# Patient Record
Sex: FEMALE | Race: BLACK OR AFRICAN AMERICAN | NOT HISPANIC OR LATINO | Employment: OTHER | URBAN - METROPOLITAN AREA
[De-identification: names, ages, dates, MRNs, and addresses within clinical notes are randomized per-mention and may not be internally consistent; named-entity substitution may affect disease eponyms.]

---

## 2018-10-25 ENCOUNTER — HOSPITAL ENCOUNTER (INPATIENT)
Facility: HOSPITAL | Age: 68
LOS: 4 days | Discharge: HOME HEALTH | DRG: 208 | End: 2018-10-29
Attending: EMERGENCY MEDICINE | Admitting: INTERNAL MEDICINE
Payer: MEDICARE

## 2018-10-25 DIAGNOSIS — Z79.4 TYPE 2 DIABETES MELLITUS WITHOUT COMPLICATION, WITH LONG-TERM CURRENT USE OF INSULIN: ICD-10-CM

## 2018-10-25 DIAGNOSIS — J96.02 ACUTE RESPIRATORY FAILURE WITH HYPOXIA AND HYPERCAPNIA: ICD-10-CM

## 2018-10-25 DIAGNOSIS — T88.4XXA DIFFICULT INTUBATION: ICD-10-CM

## 2018-10-25 DIAGNOSIS — I46.9 PEA (PULSELESS ELECTRICAL ACTIVITY): Primary | ICD-10-CM

## 2018-10-25 DIAGNOSIS — I46.9 CARDIAC ARREST: ICD-10-CM

## 2018-10-25 DIAGNOSIS — E11.9 TYPE 2 DIABETES MELLITUS WITHOUT COMPLICATION, WITH LONG-TERM CURRENT USE OF INSULIN: ICD-10-CM

## 2018-10-25 DIAGNOSIS — J96.01 ACUTE RESPIRATORY FAILURE WITH HYPOXIA AND HYPERCAPNIA: ICD-10-CM

## 2018-10-25 DIAGNOSIS — C34.81 MALIGNANT NEOPLASM OF OVERLAPPING SITES OF RIGHT LUNG: ICD-10-CM

## 2018-10-25 DIAGNOSIS — J44.9 CHRONIC OBSTRUCTIVE PULMONARY DISEASE, UNSPECIFIED COPD TYPE: ICD-10-CM

## 2018-10-25 DIAGNOSIS — R07.9 CHEST PAIN: ICD-10-CM

## 2018-10-25 DIAGNOSIS — R07.9 ACUTE CHEST PAIN: ICD-10-CM

## 2018-10-25 LAB
ALBUMIN SERPL BCP-MCNC: 2.7 G/DL
ALLENS TEST: ABNORMAL
ALP SERPL-CCNC: 107 U/L
ALT SERPL W/O P-5'-P-CCNC: 42 U/L
AMYLASE SERPL-CCNC: 66 U/L
ANION GAP SERPL CALC-SCNC: 15 MMOL/L
ANISOCYTOSIS BLD QL SMEAR: SLIGHT
APAP SERPL-MCNC: <3 UG/ML
APTT BLDCRRT: 28.5 SEC
AST SERPL-CCNC: 70 U/L
BASOPHILS # BLD AUTO: ABNORMAL K/UL
BASOPHILS NFR BLD: 0 %
BILIRUB SERPL-MCNC: 0.4 MG/DL
BNP SERPL-MCNC: 161 PG/ML
BUN SERPL-MCNC: 19 MG/DL
CALCIUM SERPL-MCNC: 8.5 MG/DL
CHLORIDE SERPL-SCNC: 97 MMOL/L
CO2 SERPL-SCNC: 26 MMOL/L
CREAT SERPL-MCNC: 1.8 MG/DL
D DIMER PPP IA.FEU-MCNC: 7.9 MG/L FEU
DELSYS: ABNORMAL
DIFFERENTIAL METHOD: ABNORMAL
EOSINOPHIL # BLD AUTO: ABNORMAL K/UL
EOSINOPHIL NFR BLD: 1 %
ERYTHROCYTE [DISTWIDTH] IN BLOOD BY AUTOMATED COUNT: 15.2 %
ERYTHROCYTE [SEDIMENTATION RATE] IN BLOOD BY WESTERGREN METHOD: 20 MM/H
EST. GFR  (AFRICAN AMERICAN): 33 ML/MIN/1.73 M^2
EST. GFR  (NON AFRICAN AMERICAN): 28 ML/MIN/1.73 M^2
ETHANOL SERPL-MCNC: <10 MG/DL
FIO2: 100
GLUCOSE SERPL-MCNC: 491 MG/DL
HCO3 UR-SCNC: 26 MMOL/L (ref 24–28)
HCT VFR BLD AUTO: 29.8 %
HCT VFR BLD CALC: 30 %PCV (ref 36–54)
HGB BLD-MCNC: 10 G/DL
HGB BLD-MCNC: 8.6 G/DL
HYPOCHROMIA BLD QL SMEAR: ABNORMAL
INR PPP: 1.1
LIPASE SERPL-CCNC: 29 U/L
LYMPHOCYTES # BLD AUTO: ABNORMAL K/UL
LYMPHOCYTES NFR BLD: 28 %
MAGNESIUM SERPL-MCNC: 1.9 MG/DL
MCH RBC QN AUTO: 27.6 PG
MCHC RBC AUTO-ENTMCNC: 28.9 G/DL
MCV RBC AUTO: 96 FL
MODE: ABNORMAL
MONOCYTES # BLD AUTO: ABNORMAL K/UL
MONOCYTES NFR BLD: 8 %
NEUTROPHILS NFR BLD: 60 %
NEUTS BAND NFR BLD MANUAL: 3 %
OVALOCYTES BLD QL SMEAR: ABNORMAL
PCO2 BLDA: 94.2 MMHG (ref 35–45)
PEEP: 5
PH SMN: 7.05 [PH] (ref 7.35–7.45)
PLATELET # BLD AUTO: 182 K/UL
PLATELET BLD QL SMEAR: ABNORMAL
PMV BLD AUTO: 10.4 FL
PO2 BLDA: 220 MMHG (ref 80–100)
POC BE: -4 MMOL/L
POC IONIZED CALCIUM: 1.2 MMOL/L (ref 1.06–1.42)
POC SATURATED O2: 99 % (ref 95–100)
POC TCO2: 29 MMOL/L (ref 23–27)
POIKILOCYTOSIS BLD QL SMEAR: SLIGHT
POLYCHROMASIA BLD QL SMEAR: ABNORMAL
POTASSIUM BLD-SCNC: 4.4 MMOL/L (ref 3.5–5.1)
POTASSIUM SERPL-SCNC: 4.3 MMOL/L
PROT SERPL-MCNC: 5.8 G/DL
PROTHROMBIN TIME: 11.7 SEC
RBC # BLD AUTO: 3.12 M/UL
SALICYLATES SERPL-MCNC: <5 MG/DL
SAMPLE: ABNORMAL
SITE: ABNORMAL
SODIUM BLD-SCNC: 136 MMOL/L (ref 136–145)
SODIUM SERPL-SCNC: 138 MMOL/L
VT: 400
WBC # BLD AUTO: 8.23 K/UL

## 2018-10-25 PROCEDURE — 85610 PROTHROMBIN TIME: CPT

## 2018-10-25 PROCEDURE — 85730 THROMBOPLASTIN TIME PARTIAL: CPT

## 2018-10-25 PROCEDURE — 82150 ASSAY OF AMYLASE: CPT

## 2018-10-25 PROCEDURE — 83540 ASSAY OF IRON: CPT

## 2018-10-25 PROCEDURE — 84132 ASSAY OF SERUM POTASSIUM: CPT

## 2018-10-25 PROCEDURE — 83605 ASSAY OF LACTIC ACID: CPT

## 2018-10-25 PROCEDURE — 84145 PROCALCITONIN (PCT): CPT

## 2018-10-25 PROCEDURE — 80307 DRUG TEST PRSMV CHEM ANLYZR: CPT

## 2018-10-25 PROCEDURE — 85379 FIBRIN DEGRADATION QUANT: CPT

## 2018-10-25 PROCEDURE — 80329 ANALGESICS NON-OPIOID 1 OR 2: CPT

## 2018-10-25 PROCEDURE — 63600175 PHARM REV CODE 636 W HCPCS

## 2018-10-25 PROCEDURE — 94002 VENT MGMT INPAT INIT DAY: CPT

## 2018-10-25 PROCEDURE — 96375 TX/PRO/DX INJ NEW DRUG ADDON: CPT

## 2018-10-25 PROCEDURE — 85014 HEMATOCRIT: CPT

## 2018-10-25 PROCEDURE — 31500 INSERT EMERGENCY AIRWAY: CPT | Mod: 59

## 2018-10-25 PROCEDURE — 84484 ASSAY OF TROPONIN QUANT: CPT

## 2018-10-25 PROCEDURE — 83735 ASSAY OF MAGNESIUM: CPT

## 2018-10-25 PROCEDURE — 99900035 HC TECH TIME PER 15 MIN (STAT)

## 2018-10-25 PROCEDURE — 83880 ASSAY OF NATRIURETIC PEPTIDE: CPT

## 2018-10-25 PROCEDURE — 87040 BLOOD CULTURE FOR BACTERIA: CPT

## 2018-10-25 PROCEDURE — 99291 CRITICAL CARE FIRST HOUR: CPT | Mod: 25

## 2018-10-25 PROCEDURE — 82728 ASSAY OF FERRITIN: CPT

## 2018-10-25 PROCEDURE — 82607 VITAMIN B-12: CPT

## 2018-10-25 PROCEDURE — 80320 DRUG SCREEN QUANTALCOHOLS: CPT

## 2018-10-25 PROCEDURE — 82330 ASSAY OF CALCIUM: CPT

## 2018-10-25 PROCEDURE — 85027 COMPLETE CBC AUTOMATED: CPT

## 2018-10-25 PROCEDURE — 25000003 PHARM REV CODE 250: Performed by: EMERGENCY MEDICINE

## 2018-10-25 PROCEDURE — 83690 ASSAY OF LIPASE: CPT

## 2018-10-25 PROCEDURE — 84295 ASSAY OF SERUM SODIUM: CPT

## 2018-10-25 PROCEDURE — 96361 HYDRATE IV INFUSION ADD-ON: CPT

## 2018-10-25 PROCEDURE — 36600 WITHDRAWAL OF ARTERIAL BLOOD: CPT

## 2018-10-25 PROCEDURE — 83036 HEMOGLOBIN GLYCOSYLATED A1C: CPT

## 2018-10-25 PROCEDURE — 99292 CRITICAL CARE ADDL 30 MIN: CPT

## 2018-10-25 PROCEDURE — 82746 ASSAY OF FOLIC ACID SERUM: CPT

## 2018-10-25 PROCEDURE — 80053 COMPREHEN METABOLIC PANEL: CPT

## 2018-10-25 PROCEDURE — 12000002 HC ACUTE/MED SURGE SEMI-PRIVATE ROOM

## 2018-10-25 PROCEDURE — 82803 BLOOD GASES ANY COMBINATION: CPT

## 2018-10-25 PROCEDURE — 85007 BL SMEAR W/DIFF WBC COUNT: CPT

## 2018-10-25 PROCEDURE — 84443 ASSAY THYROID STIM HORMONE: CPT

## 2018-10-25 PROCEDURE — 84439 ASSAY OF FREE THYROXINE: CPT

## 2018-10-25 RX ORDER — KETAMINE HCL IN 0.9 % NACL 50 MG/5 ML
50 SYRINGE (ML) INTRAVENOUS
Status: COMPLETED | OUTPATIENT
Start: 2018-10-25 | End: 2018-10-25

## 2018-10-25 RX ORDER — NOREPINEPHRINE BITARTRATE/D5W 16MG/250ML
PLASTIC BAG, INJECTION (ML) INTRAVENOUS
Status: COMPLETED
Start: 2018-10-25 | End: 2018-10-25

## 2018-10-25 RX ORDER — PROPOFOL 10 MG/ML
10 INJECTION, EMULSION INTRAVENOUS CONTINUOUS
Status: DISCONTINUED | OUTPATIENT
Start: 2018-10-26 | End: 2018-10-27

## 2018-10-25 RX ORDER — PROPOFOL 10 MG/ML
INJECTION, EMULSION INTRAVENOUS
Status: COMPLETED
Start: 2018-10-25 | End: 2018-10-26

## 2018-10-25 RX ORDER — ROCURONIUM BROMIDE 10 MG/ML
INJECTION, SOLUTION INTRAVENOUS
Status: DISCONTINUED
Start: 2018-10-25 | End: 2018-10-26 | Stop reason: WASHOUT

## 2018-10-25 RX ORDER — PROPOFOL 10 MG/ML
INJECTION, EMULSION INTRAVENOUS
Status: DISCONTINUED
Start: 2018-10-25 | End: 2018-10-26 | Stop reason: WASHOUT

## 2018-10-25 RX ORDER — SUCCINYLCHOLINE CHLORIDE 20 MG/ML
INJECTION INTRAMUSCULAR; INTRAVENOUS
Status: DISPENSED
Start: 2018-10-25 | End: 2018-10-26

## 2018-10-25 RX ORDER — KETAMINE HCL IN 0.9 % NACL 50 MG/5 ML
100 SYRINGE (ML) INTRAVENOUS
Status: COMPLETED | OUTPATIENT
Start: 2018-10-25 | End: 2018-10-25

## 2018-10-25 RX ORDER — ETOMIDATE 2 MG/ML
INJECTION INTRAVENOUS
Status: DISCONTINUED
Start: 2018-10-25 | End: 2018-10-26 | Stop reason: WASHOUT

## 2018-10-25 RX ADMIN — SODIUM CHLORIDE 1000 ML: 0.9 INJECTION, SOLUTION INTRAVENOUS at 10:10

## 2018-10-25 RX ADMIN — Medication 100 MG: at 10:10

## 2018-10-25 RX ADMIN — Medication 50 MG: at 11:10

## 2018-10-26 PROBLEM — I50.89 OTHER HEART FAILURE: Status: ACTIVE | Noted: 2018-10-26

## 2018-10-26 PROBLEM — I46.9 PEA (PULSELESS ELECTRICAL ACTIVITY): Status: ACTIVE | Noted: 2018-10-26

## 2018-10-26 PROBLEM — J96.02 ACUTE HYPERCAPNIC RESPIRATORY FAILURE: Status: ACTIVE | Noted: 2018-10-26

## 2018-10-26 PROBLEM — E11.9 TYPE 2 DIABETES MELLITUS, WITH LONG-TERM CURRENT USE OF INSULIN: Status: ACTIVE | Noted: 2018-10-26

## 2018-10-26 PROBLEM — J44.9 COPD (CHRONIC OBSTRUCTIVE PULMONARY DISEASE): Status: ACTIVE | Noted: 2018-10-26

## 2018-10-26 PROBLEM — C34.81 MALIGNANT NEOPLASM OF OVERLAPPING SITES OF RIGHT LUNG: Status: ACTIVE | Noted: 2018-10-26

## 2018-10-26 PROBLEM — I46.9 CARDIAC ARREST: Status: ACTIVE | Noted: 2018-10-26

## 2018-10-26 PROBLEM — Z79.4 TYPE 2 DIABETES MELLITUS, WITH LONG-TERM CURRENT USE OF INSULIN: Status: ACTIVE | Noted: 2018-10-26

## 2018-10-26 PROBLEM — I10 ESSENTIAL HYPERTENSION: Status: ACTIVE | Noted: 2018-10-26

## 2018-10-26 LAB
ALBUMIN SERPL BCP-MCNC: 2.5 G/DL
ALLENS TEST: ABNORMAL
ALP SERPL-CCNC: 87 U/L
ALT SERPL W/O P-5'-P-CCNC: 36 U/L
ANION GAP SERPL CALC-SCNC: 10 MMOL/L
APTT BLDCRRT: 45.3 SEC
APTT BLDCRRT: >150 SEC
AST SERPL-CCNC: 44 U/L
BACTERIA #/AREA URNS HPF: ABNORMAL /HPF
BASOPHILS # BLD AUTO: 0 K/UL
BASOPHILS NFR BLD: 0 %
BILIRUB SERPL-MCNC: 0.6 MG/DL
BILIRUB UR QL STRIP: NEGATIVE
BUN SERPL-MCNC: 23 MG/DL
CALCIUM SERPL-MCNC: 8.6 MG/DL
CHLORIDE SERPL-SCNC: 99 MMOL/L
CHOLEST SERPL-MCNC: 138 MG/DL
CHOLEST/HDLC SERPL: 4.1 {RATIO}
CLARITY UR: CLEAR
CO2 SERPL-SCNC: 29 MMOL/L
COLOR UR: YELLOW
CREAT SERPL-MCNC: 1.6 MG/DL
DELSYS: ABNORMAL
DIFFERENTIAL METHOD: ABNORMAL
EOSINOPHIL # BLD AUTO: 0 K/UL
EOSINOPHIL NFR BLD: 0 %
ERYTHROCYTE [DISTWIDTH] IN BLOOD BY AUTOMATED COUNT: 15 %
ERYTHROCYTE [SEDIMENTATION RATE] IN BLOOD BY WESTERGREN METHOD: 26 MM/H
EST. GFR  (AFRICAN AMERICAN): 38 ML/MIN/1.73 M^2
EST. GFR  (NON AFRICAN AMERICAN): 33 ML/MIN/1.73 M^2
ESTIMATED AVG GLUCOSE: 246 MG/DL
FERRITIN SERPL-MCNC: 327 NG/ML
FIO2: 75
FOLATE SERPL-MCNC: 10.5 NG/ML
GLUCOSE SERPL-MCNC: 383 MG/DL
GLUCOSE UR QL STRIP: ABNORMAL
HBA1C MFR BLD HPLC: 10.2 %
HCO3 UR-SCNC: 30.9 MMOL/L (ref 24–28)
HCT VFR BLD AUTO: 27 %
HDLC SERPL-MCNC: 34 MG/DL
HDLC SERPL: 24.6 %
HGB BLD-MCNC: 8 G/DL
HGB UR QL STRIP: ABNORMAL
HYALINE CASTS #/AREA URNS LPF: 1 /LPF
IRON SERPL-MCNC: 66 UG/DL
KETONES UR QL STRIP: NEGATIVE
LACTATE SERPL-SCNC: 1.5 MMOL/L
LACTATE SERPL-SCNC: 2.1 MMOL/L
LACTATE SERPL-SCNC: 2.2 MMOL/L
LACTATE SERPL-SCNC: 6.3 MMOL/L
LDLC SERPL CALC-MCNC: 93 MG/DL
LEUKOCYTE ESTERASE UR QL STRIP: NEGATIVE
LYMPHOCYTES # BLD AUTO: 0.5 K/UL
LYMPHOCYTES NFR BLD: 6.4 %
MAGNESIUM SERPL-MCNC: 1.5 MG/DL
MCH RBC QN AUTO: 26.8 PG
MCHC RBC AUTO-ENTMCNC: 29.6 G/DL
MCV RBC AUTO: 91 FL
MICROSCOPIC COMMENT: ABNORMAL
MODE: ABNORMAL
MONOCYTES # BLD AUTO: 0.4 K/UL
MONOCYTES NFR BLD: 5.5 %
NEUTROPHILS # BLD AUTO: 6.7 K/UL
NEUTROPHILS NFR BLD: 87.8 %
NITRITE UR QL STRIP: NEGATIVE
NONHDLC SERPL-MCNC: 104 MG/DL
PCO2 BLDA: 46.9 MMHG (ref 35–45)
PEEP: 8
PH SMN: 7.43 [PH] (ref 7.35–7.45)
PH UR STRIP: 6 [PH] (ref 5–8)
PHOSPHATE SERPL-MCNC: 3.1 MG/DL
PLATELET # BLD AUTO: 142 K/UL
PMV BLD AUTO: 9.8 FL
PO2 BLDA: 140 MMHG (ref 80–100)
POC BE: 7 MMOL/L
POC SATURATED O2: 99 % (ref 95–100)
POC TCO2: 32 MMOL/L (ref 23–27)
POCT GLUCOSE: 290 MG/DL (ref 70–110)
POCT GLUCOSE: 337 MG/DL (ref 70–110)
POCT GLUCOSE: 359 MG/DL (ref 70–110)
POCT GLUCOSE: 363 MG/DL (ref 70–110)
POTASSIUM SERPL-SCNC: 3.5 MMOL/L
PROCALCITONIN SERPL IA-MCNC: 0.06 NG/ML
PROT SERPL-MCNC: 5.7 G/DL
PROT UR QL STRIP: ABNORMAL
RBC # BLD AUTO: 2.98 M/UL
RBC #/AREA URNS HPF: 20 /HPF (ref 0–4)
SAMPLE: ABNORMAL
SATURATED IRON: 26 %
SITE: ABNORMAL
SODIUM SERPL-SCNC: 138 MMOL/L
SP GR UR STRIP: >=1.03 (ref 1–1.03)
SQUAMOUS #/AREA URNS HPF: 4 /HPF
T4 FREE SERPL-MCNC: 0.87 NG/DL
TOTAL IRON BINDING CAPACITY: 258 UG/DL
TRANSFERRIN SERPL-MCNC: 174 MG/DL
TRIGL SERPL-MCNC: 55 MG/DL
TROPONIN I SERPL DL<=0.01 NG/ML-MCNC: 0.05 NG/ML
TROPONIN I SERPL DL<=0.01 NG/ML-MCNC: 0.23 NG/ML
TROPONIN I SERPL DL<=0.01 NG/ML-MCNC: 0.51 NG/ML
TROPONIN I SERPL DL<=0.01 NG/ML-MCNC: 0.56 NG/ML
TROPONIN I SERPL DL<=0.01 NG/ML-MCNC: 0.59 NG/ML
TSH SERPL DL<=0.005 MIU/L-ACNC: 5.81 UIU/ML
URN SPEC COLLECT METH UR: ABNORMAL
UROBILINOGEN UR STRIP-ACNC: NEGATIVE EU/DL
VIT B12 SERPL-MCNC: 1997 PG/ML
VT: 400
WBC # BLD AUTO: 7.66 K/UL
WBC #/AREA URNS HPF: 1 /HPF (ref 0–5)

## 2018-10-26 PROCEDURE — A4216 STERILE WATER/SALINE, 10 ML: HCPCS | Performed by: INTERNAL MEDICINE

## 2018-10-26 PROCEDURE — 25000003 PHARM REV CODE 250: Performed by: INTERNAL MEDICINE

## 2018-10-26 PROCEDURE — 82962 GLUCOSE BLOOD TEST: CPT

## 2018-10-26 PROCEDURE — 63600175 PHARM REV CODE 636 W HCPCS: Performed by: STUDENT IN AN ORGANIZED HEALTH CARE EDUCATION/TRAINING PROGRAM

## 2018-10-26 PROCEDURE — 25500020 PHARM REV CODE 255: Performed by: INTERNAL MEDICINE

## 2018-10-26 PROCEDURE — 25000242 PHARM REV CODE 250 ALT 637 W/ HCPCS: Performed by: STUDENT IN AN ORGANIZED HEALTH CARE EDUCATION/TRAINING PROGRAM

## 2018-10-26 PROCEDURE — 83735 ASSAY OF MAGNESIUM: CPT

## 2018-10-26 PROCEDURE — 80061 LIPID PANEL: CPT

## 2018-10-26 PROCEDURE — 5A12012 PERFORMANCE OF CARDIAC OUTPUT, SINGLE, MANUAL: ICD-10-PCS | Performed by: EMERGENCY MEDICINE

## 2018-10-26 PROCEDURE — 05HA33Z INSERTION OF INFUSION DEVICE INTO LEFT BRACHIAL VEIN, PERCUTANEOUS APPROACH: ICD-10-PCS | Performed by: EMERGENCY MEDICINE

## 2018-10-26 PROCEDURE — P9612 CATHETERIZE FOR URINE SPEC: HCPCS

## 2018-10-26 PROCEDURE — 87070 CULTURE OTHR SPECIMN AEROBIC: CPT

## 2018-10-26 PROCEDURE — 96365 THER/PROPH/DIAG IV INF INIT: CPT

## 2018-10-26 PROCEDURE — 85025 COMPLETE CBC W/AUTO DIFF WBC: CPT

## 2018-10-26 PROCEDURE — 93010 ELECTROCARDIOGRAM REPORT: CPT | Mod: ,,, | Performed by: INTERNAL MEDICINE

## 2018-10-26 PROCEDURE — 94003 VENT MGMT INPAT SUBQ DAY: CPT

## 2018-10-26 PROCEDURE — 84100 ASSAY OF PHOSPHORUS: CPT

## 2018-10-26 PROCEDURE — 0BH17EZ INSERTION OF ENDOTRACHEAL AIRWAY INTO TRACHEA, VIA NATURAL OR ARTIFICIAL OPENING: ICD-10-PCS | Performed by: EMERGENCY MEDICINE

## 2018-10-26 PROCEDURE — 93306 TTE W/DOPPLER COMPLETE: CPT

## 2018-10-26 PROCEDURE — 81000 URINALYSIS NONAUTO W/SCOPE: CPT

## 2018-10-26 PROCEDURE — 63600175 PHARM REV CODE 636 W HCPCS

## 2018-10-26 PROCEDURE — 84484 ASSAY OF TROPONIN QUANT: CPT

## 2018-10-26 PROCEDURE — 27000221 HC OXYGEN, UP TO 24 HOURS

## 2018-10-26 PROCEDURE — 36600 WITHDRAWAL OF ARTERIAL BLOOD: CPT

## 2018-10-26 PROCEDURE — 87205 SMEAR GRAM STAIN: CPT

## 2018-10-26 PROCEDURE — 63600175 PHARM REV CODE 636 W HCPCS: Performed by: EMERGENCY MEDICINE

## 2018-10-26 PROCEDURE — 63600175 PHARM REV CODE 636 W HCPCS: Performed by: INTERNAL MEDICINE

## 2018-10-26 PROCEDURE — 20000000 HC ICU ROOM

## 2018-10-26 PROCEDURE — 93005 ELECTROCARDIOGRAM TRACING: CPT

## 2018-10-26 PROCEDURE — 82803 BLOOD GASES ANY COMBINATION: CPT

## 2018-10-26 PROCEDURE — 5A1945Z RESPIRATORY VENTILATION, 24-96 CONSECUTIVE HOURS: ICD-10-PCS | Performed by: INTERNAL MEDICINE

## 2018-10-26 PROCEDURE — 94761 N-INVAS EAR/PLS OXIMETRY MLT: CPT

## 2018-10-26 PROCEDURE — 63600175 PHARM REV CODE 636 W HCPCS: Performed by: HOSPITALIST

## 2018-10-26 PROCEDURE — 94640 AIRWAY INHALATION TREATMENT: CPT

## 2018-10-26 PROCEDURE — 87040 BLOOD CULTURE FOR BACTERIA: CPT

## 2018-10-26 PROCEDURE — 83605 ASSAY OF LACTIC ACID: CPT

## 2018-10-26 PROCEDURE — 99223 1ST HOSP IP/OBS HIGH 75: CPT | Mod: ,,, | Performed by: INTERNAL MEDICINE

## 2018-10-26 PROCEDURE — 05HF33Z INSERTION OF INFUSION DEVICE INTO LEFT CEPHALIC VEIN, PERCUTANEOUS APPROACH: ICD-10-PCS | Performed by: EMERGENCY MEDICINE

## 2018-10-26 PROCEDURE — 80053 COMPREHEN METABOLIC PANEL: CPT

## 2018-10-26 PROCEDURE — 97802 MEDICAL NUTRITION INDIV IN: CPT

## 2018-10-26 PROCEDURE — 99900035 HC TECH TIME PER 15 MIN (STAT)

## 2018-10-26 PROCEDURE — 25000003 PHARM REV CODE 250: Performed by: STUDENT IN AN ORGANIZED HEALTH CARE EDUCATION/TRAINING PROGRAM

## 2018-10-26 PROCEDURE — 99900038 HC OT GENERIC THERAPY SCREENING (STAT)

## 2018-10-26 PROCEDURE — 85730 THROMBOPLASTIN TIME PARTIAL: CPT | Mod: 91

## 2018-10-26 PROCEDURE — 25000003 PHARM REV CODE 250: Performed by: EMERGENCY MEDICINE

## 2018-10-26 RX ORDER — GLUCAGON 1 MG
1 KIT INJECTION
Status: DISCONTINUED | OUTPATIENT
Start: 2018-10-26 | End: 2018-10-29 | Stop reason: HOSPADM

## 2018-10-26 RX ORDER — INSULIN ASPART 100 [IU]/ML
1-10 INJECTION, SOLUTION INTRAVENOUS; SUBCUTANEOUS EVERY 4 HOURS PRN
Status: DISCONTINUED | OUTPATIENT
Start: 2018-10-26 | End: 2018-10-26

## 2018-10-26 RX ORDER — SODIUM CHLORIDE 0.9 % (FLUSH) 0.9 %
10 SYRINGE (ML) INJECTION
Status: DISCONTINUED | OUTPATIENT
Start: 2018-10-26 | End: 2018-10-29 | Stop reason: HOSPADM

## 2018-10-26 RX ORDER — BUMETANIDE 2 MG/1
2 TABLET ORAL DAILY
Status: ON HOLD | COMMUNITY
End: 2018-10-29 | Stop reason: SDUPTHER

## 2018-10-26 RX ORDER — ONDANSETRON 4 MG/1
4 TABLET, FILM COATED ORAL EVERY 8 HOURS PRN
COMMUNITY

## 2018-10-26 RX ORDER — GABAPENTIN 100 MG/1
100 CAPSULE ORAL 2 TIMES DAILY
COMMUNITY

## 2018-10-26 RX ORDER — INSULIN ASPART 100 [IU]/ML
1-10 INJECTION, SOLUTION INTRAVENOUS; SUBCUTANEOUS EVERY 6 HOURS PRN
Status: DISCONTINUED | OUTPATIENT
Start: 2018-10-26 | End: 2018-10-26

## 2018-10-26 RX ORDER — ALBUTEROL SULFATE 90 UG/1
2 AEROSOL, METERED RESPIRATORY (INHALATION) EVERY 4 HOURS PRN
COMMUNITY

## 2018-10-26 RX ORDER — NAPROXEN 500 MG/1
500 TABLET ORAL 2 TIMES DAILY PRN
COMMUNITY

## 2018-10-26 RX ORDER — IPRATROPIUM BROMIDE 0.5 MG/2.5ML
500 SOLUTION RESPIRATORY (INHALATION) EVERY 6 HOURS PRN
COMMUNITY

## 2018-10-26 RX ORDER — HYDROCODONE BITARTRATE AND ACETAMINOPHEN 5; 325 MG/1; MG/1
1 TABLET ORAL EVERY 8 HOURS PRN
COMMUNITY

## 2018-10-26 RX ORDER — IBUPROFEN 200 MG
24 TABLET ORAL
Status: DISCONTINUED | OUTPATIENT
Start: 2018-10-26 | End: 2018-10-29 | Stop reason: HOSPADM

## 2018-10-26 RX ORDER — ATORVASTATIN CALCIUM 80 MG/1
80 TABLET, FILM COATED ORAL DAILY
COMMUNITY

## 2018-10-26 RX ORDER — PREDNISONE 10 MG/1
10 TABLET ORAL 2 TIMES DAILY
Status: DISCONTINUED | OUTPATIENT
Start: 2018-10-26 | End: 2018-10-26

## 2018-10-26 RX ORDER — PREDNISONE 10 MG/1
10 TABLET ORAL 2 TIMES DAILY
COMMUNITY

## 2018-10-26 RX ORDER — LEVOFLOXACIN 750 MG/1
750 TABLET ORAL DAILY
COMMUNITY

## 2018-10-26 RX ORDER — HYDROMORPHONE HYDROCHLORIDE 4 MG/1
4 TABLET ORAL EVERY 8 HOURS PRN
COMMUNITY

## 2018-10-26 RX ORDER — HEPARIN SODIUM,PORCINE/D5W 25000/250
18 INTRAVENOUS SOLUTION INTRAVENOUS CONTINUOUS
Status: DISCONTINUED | OUTPATIENT
Start: 2018-10-26 | End: 2018-10-26

## 2018-10-26 RX ORDER — NOREPINEPHRINE BITARTRATE/D5W 16MG/250ML
0.02 PLASTIC BAG, INJECTION (ML) INTRAVENOUS CONTINUOUS
Status: DISCONTINUED | OUTPATIENT
Start: 2018-10-26 | End: 2018-10-27

## 2018-10-26 RX ORDER — ALPRAZOLAM 1 MG/1
1 TABLET ORAL EVERY 4 HOURS PRN
COMMUNITY

## 2018-10-26 RX ORDER — AZITHROMYCIN 250 MG/1
250 TABLET, FILM COATED ORAL DAILY
COMMUNITY

## 2018-10-26 RX ORDER — ASPIRIN 81 MG/1
81 TABLET ORAL DAILY
COMMUNITY

## 2018-10-26 RX ORDER — HEPARIN SODIUM 5000 [USP'U]/ML
5000 INJECTION, SOLUTION INTRAVENOUS; SUBCUTANEOUS EVERY 8 HOURS
Status: DISCONTINUED | OUTPATIENT
Start: 2018-10-26 | End: 2018-10-29 | Stop reason: HOSPADM

## 2018-10-26 RX ORDER — ALBUTEROL SULFATE 0.83 MG/ML
2.5 SOLUTION RESPIRATORY (INHALATION) EVERY 6 HOURS PRN
COMMUNITY

## 2018-10-26 RX ORDER — PROMETHAZINE HYDROCHLORIDE AND CODEINE PHOSPHATE 6.25; 1 MG/5ML; MG/5ML
5 SOLUTION ORAL EVERY 6 HOURS PRN
COMMUNITY

## 2018-10-26 RX ORDER — INSULIN ASPART 100 [IU]/ML
1-10 INJECTION, SOLUTION INTRAVENOUS; SUBCUTANEOUS EVERY 4 HOURS PRN
Status: DISCONTINUED | OUTPATIENT
Start: 2018-10-26 | End: 2018-10-29 | Stop reason: HOSPADM

## 2018-10-26 RX ORDER — GLUCAGON 1 MG
1 KIT INJECTION
Status: DISCONTINUED | OUTPATIENT
Start: 2018-10-26 | End: 2018-10-26

## 2018-10-26 RX ORDER — PREDNISONE 20 MG/1
60 TABLET ORAL DAILY
Status: DISCONTINUED | OUTPATIENT
Start: 2018-10-26 | End: 2018-10-29 | Stop reason: HOSPADM

## 2018-10-26 RX ORDER — INSULIN DEGLUDEC 100 U/ML
80 INJECTION, SOLUTION SUBCUTANEOUS
COMMUNITY

## 2018-10-26 RX ORDER — IPRATROPIUM BROMIDE AND ALBUTEROL SULFATE 2.5; .5 MG/3ML; MG/3ML
3 SOLUTION RESPIRATORY (INHALATION) EVERY 4 HOURS
Status: DISCONTINUED | OUTPATIENT
Start: 2018-10-26 | End: 2018-10-29 | Stop reason: HOSPADM

## 2018-10-26 RX ORDER — FLUCONAZOLE 100 MG/1
100 TABLET ORAL DAILY
COMMUNITY

## 2018-10-26 RX ORDER — AMOXICILLIN AND CLAVULANATE POTASSIUM 875; 125 MG/1; MG/1
1 TABLET, FILM COATED ORAL 2 TIMES DAILY
COMMUNITY

## 2018-10-26 RX ORDER — SODIUM CHLORIDE 0.9 % (FLUSH) 0.9 %
5 SYRINGE (ML) INJECTION
Status: DISCONTINUED | OUTPATIENT
Start: 2018-10-26 | End: 2018-10-29 | Stop reason: HOSPADM

## 2018-10-26 RX ORDER — LABETALOL HYDROCHLORIDE 5 MG/ML
10 INJECTION, SOLUTION INTRAVENOUS EVERY 4 HOURS PRN
Status: DISCONTINUED | OUTPATIENT
Start: 2018-10-26 | End: 2018-10-29 | Stop reason: HOSPADM

## 2018-10-26 RX ORDER — SODIUM CHLORIDE 0.9 % (FLUSH) 0.9 %
10 SYRINGE (ML) INJECTION EVERY 6 HOURS
Status: DISCONTINUED | OUTPATIENT
Start: 2018-10-26 | End: 2018-10-29 | Stop reason: HOSPADM

## 2018-10-26 RX ORDER — CARVEDILOL 25 MG/1
25 TABLET ORAL 2 TIMES DAILY
COMMUNITY

## 2018-10-26 RX ORDER — IBUPROFEN 200 MG
16 TABLET ORAL
Status: DISCONTINUED | OUTPATIENT
Start: 2018-10-26 | End: 2018-10-29 | Stop reason: HOSPADM

## 2018-10-26 RX ORDER — INSULIN ASPART 100 [IU]/ML
32 INJECTION, SOLUTION INTRAVENOUS; SUBCUTANEOUS
COMMUNITY

## 2018-10-26 RX ORDER — MAGNESIUM SULFATE HEPTAHYDRATE 40 MG/ML
2 INJECTION, SOLUTION INTRAVENOUS ONCE
Status: COMPLETED | OUTPATIENT
Start: 2018-10-26 | End: 2018-10-26

## 2018-10-26 RX ADMIN — Medication 0.02 MCG/KG/MIN: at 02:10

## 2018-10-26 RX ADMIN — INSULIN DETEMIR 20 UNITS: 100 INJECTION, SOLUTION SUBCUTANEOUS at 08:10

## 2018-10-26 RX ADMIN — MAGNESIUM SULFATE IN WATER 2 G: 40 INJECTION, SOLUTION INTRAVENOUS at 09:10

## 2018-10-26 RX ADMIN — INSULIN DETEMIR 20 UNITS: 100 INJECTION, SOLUTION SUBCUTANEOUS at 03:10

## 2018-10-26 RX ADMIN — METHYLPREDNISOLONE SODIUM SUCCINATE 40 MG: 40 INJECTION, POWDER, FOR SOLUTION INTRAMUSCULAR; INTRAVENOUS at 08:10

## 2018-10-26 RX ADMIN — IPRATROPIUM BROMIDE AND ALBUTEROL SULFATE 3 ML: .5; 3 SOLUTION RESPIRATORY (INHALATION) at 07:10

## 2018-10-26 RX ADMIN — PREDNISONE 60 MG: 20 TABLET ORAL at 09:10

## 2018-10-26 RX ADMIN — IPRATROPIUM BROMIDE AND ALBUTEROL SULFATE 3 ML: .5; 3 SOLUTION RESPIRATORY (INHALATION) at 03:10

## 2018-10-26 RX ADMIN — HEPARIN SODIUM AND DEXTROSE 18 UNITS/KG/HR: 10000; 5 INJECTION INTRAVENOUS at 09:10

## 2018-10-26 RX ADMIN — INSULIN ASPART 8 UNITS: 100 INJECTION, SOLUTION INTRAVENOUS; SUBCUTANEOUS at 12:10

## 2018-10-26 RX ADMIN — IPRATROPIUM BROMIDE AND ALBUTEROL SULFATE 3 ML: .5; 3 SOLUTION RESPIRATORY (INHALATION) at 01:10

## 2018-10-26 RX ADMIN — IPRATROPIUM BROMIDE AND ALBUTEROL SULFATE 3 ML: .5; 3 SOLUTION RESPIRATORY (INHALATION) at 11:10

## 2018-10-26 RX ADMIN — HEPARIN SODIUM 5000 UNITS: 5000 INJECTION, SOLUTION INTRAVENOUS; SUBCUTANEOUS at 09:10

## 2018-10-26 RX ADMIN — HEPARIN SODIUM 5000 UNITS: 5000 INJECTION, SOLUTION INTRAVENOUS; SUBCUTANEOUS at 02:10

## 2018-10-26 RX ADMIN — VANCOMYCIN HYDROCHLORIDE 3000 MG: 10 INJECTION, POWDER, LYOPHILIZED, FOR SOLUTION INTRAVENOUS at 02:10

## 2018-10-26 RX ADMIN — PROPOFOL 1000 MG: 10 INJECTION, EMULSION INTRAVENOUS at 12:10

## 2018-10-26 RX ADMIN — PIPERACILLIN AND TAZOBACTAM 4.5 G: 4; .5 INJECTION, POWDER, LYOPHILIZED, FOR SOLUTION INTRAVENOUS; PARENTERAL at 01:10

## 2018-10-26 RX ADMIN — PROPOFOL 10 MCG/KG/MIN: 10 INJECTION, EMULSION INTRAVENOUS at 06:10

## 2018-10-26 RX ADMIN — INSULIN ASPART 6 UNITS: 100 INJECTION, SOLUTION INTRAVENOUS; SUBCUTANEOUS at 05:10

## 2018-10-26 RX ADMIN — HEPARIN SODIUM AND DEXTROSE 18 UNITS/KG/HR: 10000; 5 INJECTION INTRAVENOUS at 02:10

## 2018-10-26 RX ADMIN — IOHEXOL 100 ML: 350 INJECTION, SOLUTION INTRAVENOUS at 08:10

## 2018-10-26 RX ADMIN — Medication 10 ML: at 05:10

## 2018-10-26 NOTE — ED NOTES
Pt. Is awake and opens eyes upon verbal commands. Pt. Has equal, strong hand grasps bilaterally and is able to move all extremities well. Pt. Trying to reach for ETT. Dr. Magdaleno at the bedside. Verbal order received for soft restraints.

## 2018-10-26 NOTE — PLAN OF CARE
Problem: Patient Care Overview  Goal: Plan of Care Review  Outcome: Ongoing (interventions implemented as appropriate)    10/26/2018 6:51 AM   Plan of Care   Plan Of Care Reviewed With pt, Family Safety: call light in reach, current questions/concerns addressed to family, bed in lowest position with wheels locked & side rails up X 3. Pt and family were educated regarding fall precaution and taking appropriate action. Activity: bedrest.  Neurological: BONY Respiratory: Pt intubated and sedated, tolerating well, O2 sat WNL.  Cardiac: BP stable on Levo, HR stable. Afebrile this shift. Intake/Output: No bm over night. No problem with UO. Diet: NPO maintained as ordered Pain: Nonverbal signs absent Skin: dry and intact. LDA: All lines patent and infusing. Plan: Ruleout PE

## 2018-10-26 NOTE — EICU
Called to room for PICC line placement.  Consent confirmed with bedside team.  Time out completed using proper pt identifiers.  Dual lumen PICC placed to (L) arm by Essie using u/s guidance.  Blood return noted from both ports.  Lines flush easily.  Pt tolerated procedure well.  PCXR on order.

## 2018-10-26 NOTE — CONSULTS
[facility] Pulmonology Consult - Resident Note    Primary Attending Physician: Dr. Keyes  Primary Team: [facility] Internal Medicine  Consultant Attending/Fellow: Dr. Chakraborty, Dr. Oates  Consultant Resident: Dr. Soto    Reason for Consult:     Ventilator Management    Subjective:      History of Present Illness:    History obtained from chart review, patient received her care at [facility] so we do not have access to medical records.     Tammie Saunders is a 67 y.o. AA female who has a past medical history of DM2, HTN, ? History of COPD, lung CA undergoing chemotherapy, on 2LNC baseline at home. The patient presented to the [facility] on 10/25/2018 with a primary complaint of Cardiac Arrest (pt to ED via [facility], EMS reports being called out for SOB states put pt on CPAP and was unable to gain IV access. On arrival to ED pt was limp and pulse was not felt, CPR initiated. )    Patient's family reports that patient at baseline has some degree SOB 2/2 her malignancy and other poor lung function history (family not entirely clear on medical hx), utlizes 2L NC at baseline. On day of admit, she had acutely worsening shortness of breath and EMS was called. Placed on CPAP with EMS, was found to be limp on arrival to ED without pulse, PEA arrest. CPR given with 2 rounds compressions and 1 round epi before ROSC. Patient was alert, moving all extremities and following commands after regaining pulses. Patient with PRIYANKA vs CKD, so CTA was postponed and unable to obtain VQ on ventilator overnight. Empirically treated with heparin for concern for PE. She was placed on propofol overnight with brief episode of hypotension requiring pressors, resolved quickly.    Initial ABG with 7.050/94.2/220/26 on 100% FiO2 after intubation   CXR and CT noncon with slightly enlarged heart, perihilar consolidative changes bilaterally with air bronchograms, interlobular septal thickening, pulmonary edema and small bilateral pleural  "effusions. Unsure how much is chronic, though likely is aside from edema given hx of lung CA.  LE US negative for DVT  Renal function improved overnight, CTA with no evidence PE.    This morning patient became very anxious during SBT, lots of wheezing and high airway pressures.    Past Medical History:  As above, [facility] records pending    Past Surgical History:  Cholecystectomy  Bypass left LE        Allergies:  Review of patient's allergies indicates:  No Known Allergies    Medications:   In-Hospital Scheduled Medications:   albuterol-ipratropium  3 mL Nebulization Q4H    insulin detemir U-100  20 Units Subcutaneous BID    magnesium sulfate IVPB  2 g Intravenous Once    predniSONE  10 mg Per NG tube BID    succinylcholine          In-Hospital PRN Medications:  dextrose 50%, dextrose 50%, glucagon (human recombinant), glucose, glucose, heparin (PORCINE), heparin (PORCINE), insulin aspart U-100, labetalol, sodium chloride 0.9%   In-Hospital IV Infusion Medications:   heparin (porcine) in D5W Stopped (10/26/18 0907)    norepinephrine bitartrate-D5W Stopped (10/26/18 0717)    propofol 10 mcg/kg/min (10/26/18 0633)      Home Medications:  Prior to Admission medications    Not on File       Family History:  No family history on file.    Social History:  Social History     Tobacco Use    Smoking status: Not on file   Substance Use Topics    Alcohol use: Not on file    Drug use: Not on file       Review of Systems:  Review of systems not obtained due to patient factors patient intubated and sedated.       Objective:   Last 24 Hour Vital Signs:  BP  Min: 97/53  Max: 242/109  Temp  Av.8 °F (36.6 °C)  Min: 97.4 °F (36.3 °C)  Max: 98.1 °F (36.7 °C)  Pulse  Av  Min: 74  Max: 136  Resp  Av.9  Min: 7  Max: 55  SpO2  Av.8 %  Min: 93 %  Max: 100 %  Height  Av' 6" (167.6 cm)  Min: 5' 6" (167.6 cm)  Max: 5' 6" (167.6 cm)  Weight  Av.8 kg (284 lb 0.6 oz)  Min: 121.6 kg (268 lb 1.3 oz)  Max: 136.1 " kg (300 lb)  I/O last 3 completed shifts:  In: 944 [I.V.:235.2; IV Piggyback:708.9]  Out: 215 [Urine:215]    Physical Examination:  Gen: sleeping comfortably, awakens to light touch, nods to questions  HEENT: non-traumatic, EOMI, PERRL, ET tube to ventilator  CV: RRR, heart sounds somewhat distant, no m/r/g  Resp: diffuse wheezes bilaterally on A/C VC Vt 400, R 26, PEEP 5, 50% FiO2  GI: soft, obese, nontender, + bowel sounds  Neuro: on propofol, when weaned opens eyes to stimulation, tracks appropriately, tachypnea with anxiety     Laboratory Results:  Most Recent Data:  CBC:   Lab Results   Component Value Date    WBC 7.66 10/26/2018    HGB 8.0 (L) 10/26/2018    HCT 27.0 (L) 10/26/2018     (L) 10/26/2018    MCV 91 10/26/2018    RDW 15.0 (H) 10/26/2018     BMP:   Lab Results   Component Value Date     10/26/2018    K 3.5 10/26/2018    CL 99 10/26/2018    CO2 29 10/26/2018    BUN 23 10/26/2018     (H) 10/26/2018    CALCIUM 8.6 (L) 10/26/2018    MG 1.5 (L) 10/26/2018    PHOS 3.1 10/26/2018     LFTs:   Lab Results   Component Value Date    PROT 5.7 (L) 10/26/2018    ALBUMIN 2.5 (L) 10/26/2018    BILITOT 0.6 10/26/2018    AST 44 (H) 10/26/2018    ALKPHOS 87 10/26/2018    ALT 36 10/26/2018     Coags:   Lab Results   Component Value Date    INR 1.1 10/25/2018     FLP:   Lab Results   Component Value Date    CHOL 138 10/26/2018    HDL 34 (L) 10/26/2018    LDLCALC 93.0 10/26/2018    TRIG 55 10/26/2018    CHOLHDL 24.6 10/26/2018     DM:   Lab Results   Component Value Date    HGBA1C 10.2 (H) 10/25/2018    LDLCALC 93.0 10/26/2018    CREATININE 1.6 (H) 10/26/2018     Thyroid:   Lab Results   Component Value Date    TSH 5.806 (H) 10/25/2018    FREET4 0.87 10/25/2018     Anemia:   Lab Results   Component Value Date    IRON 66 10/25/2018    TIBC 258 10/25/2018    FERRITIN 327 (H) 10/25/2018    FXELNLMK83 1997 (H) 10/25/2018    FOLATE 10.5 10/25/2018     Cardiac:   Lab Results   Component Value Date     TROPONINI 0.589 (H) 10/26/2018     (H) 10/25/2018     Urinalysis:   Lab Results   Component Value Date    COLORU Yellow 10/26/2018    SPECGRAV >=1.030 (A) 10/26/2018    NITRITE Negative 10/26/2018    KETONESU Negative 10/26/2018    UROBILINOGEN Negative 10/26/2018       Trended Lab Data:  Recent Labs   Lab 10/25/18  2256 10/25/18  2309 10/26/18  0537   WBC  --  8.23 7.66   HGB  --  8.6* 8.0*   HCT 30* 29.8* 27.0*   PLT  --  182 142*   MCV  --  96 91   RDW  --  15.2* 15.0*   NA  --  138 138   K  --  4.3 3.5   CL  --  97 99   CO2  --  26 29   BUN  --  19 23   GLU  --  491* 383*   PROT  --  5.8* 5.7*   ALBUMIN  --  2.7* 2.5*   BILITOT  --  0.4 0.6   AST  --  70* 44*   ALKPHOS  --  107 87   ALT  --  42 36       Trended Cardiac Data:  Recent Labs   Lab 10/25/18  2309 10/26/18  0230 10/26/18  0537   TROPONINI 0.053* 0.232* 0.589*   *  --   --        Microbiology Data:  Blood Cx in process    Other Laboratory Data:      Other Results:  EKG (my interpretation): inverted T waves with ST flattening in lateral leads.    Radiology:  Ct Head Without Contrast    Result Date: 10/26/2018  EXAMINATION: CT HEAD WITHOUT CONTRAST CLINICAL HISTORY: AMS; TECHNIQUE: Low dose axial images were obtained through the head.  Coronal and sagittal reformations were also performed. Contrast was not administered. COMPARISON: None. FINDINGS: There is mild chronic microvascular ischemic change.  No evidence of acute/recent major vascular distribution cerebral infarction, intraparenchymal hemorrhage, or intra-axial space occupying lesion. The ventricular system is normal in size and configuration with no evidence of hydrocephalus. No effacement of the skull-base cisterns. No abnormal extra-axial fluid collections or blood products. The visualized paranasal sinuses and mastoid air cells are clear. The calvarium shows no significant abnormality.  Bilateral exophthalmos is noted.     No acute intracranial abnormalities identified.  Electronically signed by: Sarah Hawley MD Date:    10/26/2018 Time:    00:37    Ct Chest Without Contrast    Result Date: 10/26/2018  EXAMINATION: CT CHEST WITHOUT CONTRAST CLINICAL HISTORY: hypoxia; TECHNIQUE: Low dose axial images, sagittal and coronal reformations were obtained from the thoracic inlet to the lung bases. Contrast was not administered. COMPARISON: None. FINDINGS: Structures at the base of the neck are unremarkable.  Left-sided PICC line is visualized with distal tip in the SVC.  Aorta is non-aneurysmal.  The heart is normal in size without pericardial effusion.  Aortic and coronary artery atherosclerosis is seen.  Several prominent mediastinal lymph nodes are seen.  Esophagus is unremarkable along its course. The trachea and bronchi are patent.  Endotracheal tube is visualized within the distal trachea extending just above the liu projecting toward the right mainstem bronchus.  Prominent confluent consolidative changes are seen involving the bilateral perihilar regions with air bronchograms.  Additional more patchy consolidative changes are seen within the left lower lobe.  Small bilateral pleural effusions with additional compressive atelectatic changes are seen within the lower lobes.  There is interlobular septal thickening with scattered ground-glass attenuation and patchy opacities seen throughout the lungs which may reflect pulmonary edema.  Some of these opacities demonstrates somewhat nodular configuration. The visualized abdominal structures are unremarkable.  Gallbladder has been removed.  Osseous structures demonstrate mild age-appropriate degenerative change.  Extrathoracic soft tissues are unremarkable.     1. Confluent bilateral perihilar consolidative changes with air bronchograms with additional more patchy consolidation seen within the left lower lobe.  Findings may reflect multifocal pneumonia.  Future follow-up is recommended as potential underlying neoplastic process unable  to be excluded. 2. Interlobular septal thickening with additional patchy opacities and scattered ground-glass attenuation seen within the lungs suggestive for superimposed pulmonary edema. 3. Small bilateral pleural effusions with resultant compressive atelectasis. Electronically signed by: Sarah Hawley MD Date:    10/26/2018 Time:    00:47    Cta Chest Non Coronary    Result Date: 10/26/2018  EXAMINATION: CTA CHEST NON CORONARY CLINICAL HISTORY: Chest pain, acute, PE suspected, high pretest prob;concern for PE;Chest pain, unspecified TECHNIQUE: Low dose axial images, sagittal and coronal reformations were obtained from the thoracic inlet to the lung bases following the IV administration of 100 mL of Omnipaque 350.  Contrast timing was optimized to evaluate the pulmonary arteries.  MIP images were performed. COMPARISON: 10/26/2018 FINDINGS: There is good opacification of the pulmonary arterial system, there is no evidence of pulmonary embolus.  The thoracic aorta is of normal caliber, there is mild atherosclerotic plaque.  There are coronary artery calcifications.  No pericardial effusion.  Upper normal size mediastinal and hilar nodes consistent with the likely reactive nodes.  ET tube distal tip distal trachea.  There is a small right pleural effusion. There is thickening mostly noted of the peribronchovascular bundle centrally bilaterally.  There is patchy subsegmental areas of airspace consolidation and small subcentimeter nodules right upper lobe. There is middle lobe airspace consolidation more so centrally. Within the right lower lobe, there are subsegmental areas of airspace consolidation and volume loss involving more so the superior segment and additional subsegmental areas of patchy opacity in the remainder of the right lower lobe. The left upper lobe demonstrate few subcentimeter nodularity.  Very minimal subsegmental atelectasis at the lingula.  Airspace consolidation and volume loss noted involving  more so the superior segment of the left lower lobe and patchy subsegmental areas of the basal segments.  There is no left pleural effusion.  The axillary regions appear normal.  The NG tube distal tip is seen within the stomach.  The osseous structures appear normal.     Bilateral more central airspace consolidation, unchanged from the prior recent study most suggestive of pneumonia, aspiration or hemorrhage with volume loss.  Underlying neoplasm not excluded. No evidence of pulmonary embolus. Small right pleural effusion. Upper normal size nodes within the mediastinum and hilar region could be reactive to the underlying inflammatory process, could also be metastatic. Electronically signed by: Leydi Gill MD Date:    10/26/2018 Time:    08:56    X-ray Chest Ap Portable    Result Date: 10/25/2018  EXAMINATION: XR CHEST AP PORTABLE CLINICAL HISTORY: Failed or difficult intubation, initial encounter TECHNIQUE: Single frontal view of the chest was performed. COMPARISON: None FINDINGS: Evaluation limited by large body habitus and relative underpenetrated portable technique. Left-sided PICC line is visualized with distal tip over the SVC projecting horizontally toward the right.  Endotracheal tube is visualized with distal tip approximately 1.5 cm above the liu.  Support device overlies the left chest wall. Cardiac silhouette is upper limits of normal in size.  There is prominence of central pulmonary vasculature with bilateral perihilar opacity and increased interstitial attenuation suggestive for pulmonary edema.  Consolidative changes are visualized in the right midlung zone perihilar region.  This could reflect pneumonia or asymmetric edema, with potential perihilar lesion not excluded.  No prior studies are available for comparison.  No evidence of pneumothorax or significant effusion.     See above. Electronically signed by: Sarah Hawley MD Date:    10/25/2018 Time:    23:09     Lower Extremity  Veins Bilateral    Result Date: 10/26/2018  EXAMINATION: US LOWER EXTREMITY VEINS BILATERAL CLINICAL HISTORY: concern for DVT; Acute respiratory failure with hypoxia TECHNIQUE: Duplex and color flow Doppler and dynamic compression was performed of the bilateral lower extremity veins was performed. COMPARISON: None FINDINGS: Right thigh veins: The common femoral, femoral, popliteal, upper greater saphenous, and deep femoral veins are patent and free of thrombus. The veins are normally compressible and have normal phasic flow and augmentation response. Right calf veins: The visualized calf veins are patent. Left thigh veins: The common femoral, femoral, popliteal, upper greater saphenous, and deep femoral veins are patent and free of thrombus. The veins are normally compressible and have normal phasic flow and augmentation response. Left calf veins: The visualized calf veins are patent. Miscellaneous: None     No evidence of deep venous thrombosis in either lower extremity. Electronically signed by: Florentin Beyer MD Date:    10/26/2018 Time:    02:08         Assessment:     Tammie Saunders is a 67 y.o. female with:  Patient Active Problem List    Diagnosis Date Noted    Cardiac arrest 10/26/2018    COPD (chronic obstructive pulmonary disease) 10/26/2018    Acute hypercapnic respiratory failure 10/26/2018    Malignant neoplasm of overlapping sites of right lung 10/26/2018    Other heart failure 10/26/2018    Essential hypertension 10/26/2018    Type 2 diabetes mellitus, with long-term current use of insulin 10/26/2018    PEA (Pulseless electrical activity) 10/26/2018        Plan:     Tammie Saunders is a 67 y.o. AA female who has a past medical history of DM2, HTN, ?history of COPD, lung CA undergoing chemotherapy, on 2LNC baseline at home, presenting with acute shortness of breath and PEA arrest, ABG with severe respiratory acidosis, improving on ventilator, however with failed SBT today 2/2 severe anxiety, high  airway pressures.    Acute hypercapnic respiratory failure on ventilator  - ABG much improved this morning, O2 requirements reduced to 40%   - mild rise in troponin, likely 2/2 demand vs resuscitation, will follow TTE, cards consulted  - recommend discontinuing heparin gtt as no evidence PE on CTA   - agree with continued broad abx coverage pending procalcitonin; nursing also reported suctioning food out of ET tube after code, afebrile at this time but unclear if aspirated- continue to monitor  - daily SBT, in discussing with patient's children, has been intubated in the past and required anxiolytics to extubate  - primary team working on obtaining records from [facility], will be helpful to determine what workup has been done so far/other pulm diagnoses. Carries COPD however family says she uses only one inhaler, ventolin, are not certain how she uses it either. Discussed with family bringing in home medication list as well  - Recommend diuresis; patient with B lines diffusely on bedside ultrasound today, edema to bilateral lower extremities, bedside echo with somewhat reduced EF, will follow formal     Thank you for allowing us to participate in the care of this patient. Please contact if you have any questions regarding this consult.    Mine Soto  [facility]     Pt seen and examined with Pulmonary/Critical Care team. Critical Care time was spent validating the history and physical exam, reviewing the lab and imaging results, and discussing the care of the patient with the bedside nurse. The following additional comments are made:    Pt awakens and follows commands.  We attempted SBT but pt became tachypnic. Mechanics of breathing very poor. High airways resistance, 10 cm autoPEEP, high PIPs, Pt wheezing. Hemodynamics stable. LV function by my beside echo was normal. RV however seems a little large. May have aspirated. Leave on vent tonight.  Reassess in AM.    Critical Care time  48 minutes    Oscar Rocha MD  Phone [phone]

## 2018-10-26 NOTE — ED NOTES
BP- 220/90 Levophed infusion decreased to 0.25mcg/kg. Dr. Magdaleno at the bedside for blood draw from right femoral area.

## 2018-10-26 NOTE — CONSULTS
"[facility]  Hematology/Oncology  Consult Note    Patient Name: Tammie Saunders  MRN: [id number]  Admission Date: 10/25/2018  Hospital Length of Stay: 0 days  Code Status: Full Code   Attending Provider: Fabricio Keyes MD  Consulting Provider: Paul Berrios MD  Primary Care Physician: Tanner Strauss  Principal Problem:<principal problem not specified>    Inpatient consult to Hematology/Oncology  Consult performed by: Paul Berrios MD  Consult ordered by: Fabricio Keyes MD  Reason for consult: patient on chemo prior to arrival.        Subjective:     HPI:  67 year-old female was admitted on 10/25/18 for pulseless electrical activity cardiac arrest. Consult is for "patient on chemo prior to arrival." Comorbid conditions include non-small cell lung cancer of the right lung on third-line docetaxel chemotherapy, type 2 diabetes mellitus with long-term use of insulin and hyperglycemia.     Regarding her lung cancer diagnosis, she is treated at [facility]. Information provided by her daughters. Sounds like she just started with her first dose of docetaxel earlier in October. Prior to that, she received pembrolizumab in the second-line setting. It is unclear what she received in the first-line setting, but it was likely a platinum doublet.    The daughters are concerned that the docetaxel perhaps caused her current cardiac episode.    Oncology Treatment Plan:   [No treatment plan]    Medications:  Continuous Infusions:   norepinephrine bitartrate-D5W Stopped (10/26/18 0717)    propofol Stopped (10/26/18 1223)     Scheduled Meds:   albuterol-ipratropium  3 mL Nebulization Q4H    heparin (porcine)  5,000 Units Subcutaneous Q8H    insulin detemir U-100  20 Units Subcutaneous BID    predniSONE  60 mg Per NG tube Daily    sodium chloride 0.9%  10 mL Intravenous Q6H     PRN Meds:dextrose 50%, dextrose 50%, glucagon (human recombinant), glucose, glucose, insulin aspart U-100, labetalol, " Flushing PICC Protocol **AND** sodium chloride 0.9% **AND** sodium chloride 0.9%, sodium chloride 0.9%     Review of patient's allergies indicates:  No Known Allergies     No past medical history on file.  No past surgical history on file.  Family History     None        Tobacco Use    Smoking status: Not on file   Substance and Sexual Activity    Alcohol use: Not on file    Drug use: Not on file    Sexual activity: Not on file     Review of Systems   Unable to perform ROS: Intubated     Objective:     Vital Signs (Most Recent):  Temp: 99.5 °F (37.5 °C) (10/26/18 1104)  Pulse: 94 (10/26/18 1530)  Resp: (!) 22 (10/26/18 1530)  BP: 132/66 (10/26/18 1530)  SpO2: 97 % (10/26/18 1530) Vital Signs (24h Range):  Temp:  [97.4 °F (36.3 °C)-99.5 °F (37.5 °C)] 99.5 °F (37.5 °C)  Pulse:  [] 94  Resp:  [7-55] 22  SpO2:  [91 %-100 %] 97 %  BP: ()/() 132/66     Weight: 121.6 kg (268 lb 1.3 oz)  Body mass index is 43.27 kg/m².  Body surface area is 2.38 meters squared.      Intake/Output Summary (Last 24 hours) at 10/26/2018 1627  Last data filed at 10/26/2018 1600  Gross per 24 hour   Intake 1145.05 ml   Output 585 ml   Net 560.05 ml       Physical Exam   Constitutional: She appears well-developed and well-nourished.   HENT:   Head: Normocephalic and atraumatic.   Throat is intubated   Eyes: No scleral icterus.   Neck: Normal range of motion. Neck supple.   Cardiovascular: Regular rhythm.   tachycardic   Pulmonary/Chest:   Intubated, coarse breath sounds   Abdominal: Soft.   Musculoskeletal: She exhibits no edema.   Neurological:   Intubated and sedated   Skin: Skin is warm and dry.   Psychiatric:   Unable to assess   Nursing note and vitals reviewed.    Significant Labs:   Labs have been reviewed.    Diagnostic Results:  CTA chest (10/26/18): I have personally reviewed the images  - There is good opacification of the pulmonary arterial system, there is no evidence of pulmonary embolus.  The thoracic aorta is  of normal caliber, there is mild atherosclerotic plaque.  There are coronary artery calcifications.  No pericardial effusion.  Upper normal size mediastinal and hilar nodes consistent with the likely reactive nodes.  ET tube distal tip distal trachea.  There is a small right pleural effusion.  - There is thickening mostly noted of the peribronchovascular bundle centrally bilaterally.  There is patchy subsegmental areas of airspace consolidation and small subcentimeter nodules right upper lobe.  - There is middle lobe airspace consolidation more so centrally.  - Within the right lower lobe, there are subsegmental areas of airspace consolidation and volume loss involving more so the superior segment and additional subsegmental areas of patchy opacity in the remainder of the right lower lobe.  - The left upper lobe demonstrate few subcentimeter nodularity.  Very minimal subsegmental atelectasis at the lingula.  Airspace consolidation and volume loss noted involving more so the superior segment of the left lower lobe and patchy subsegmental areas of the basal segments.  There is no left pleural effusion.  The axillary regions appear normal.  The NG tube distal tip is seen within the stomach.  The osseous structures appear normal.    Assessment/Plan:     Malignant neoplasm of overlapping sites of right lung    - patient of an oncologist at [facility].  - patient had just started docetaxel earlier this month. She had received one dose of docetaxel so far.  - it is unclear whether the docetaxel contributed to her cardiac event. There are reports of decreased ejection fraction with docetaxel, but that is rare. There can be hypotension associated with docetaxel, but that is typically during administration or soon after.  - I would be hesitant to re-challenge with docetaxel, but I will defer that decision to her primary oncologist.  - overall, her prognosis from a lung cancer standpoint is poor, being  that she is on third-line therapy. I do not have all the information about her disease (molecular markers, PD-L1 status, mutation status), so it is hard to make a true prognostic estimate. However, typically responses on third-line therapy are short (a few months).         Thank you for your consult.    Paul Berrios MD  Hematology/Oncology  [facility]

## 2018-10-26 NOTE — ED NOTES
Pulse oximetry increasing to 61%. X ray paged for ETT verification. Cardiac monitor shows sinus rhythm.

## 2018-10-26 NOTE — PROGRESS NOTES
PICC line noted to be displaced in pt arm. Morning xray states PICC line tip in brachialcephalic vein. Primary team notified. Will take PICC line out. Primary team to place orders for new PICC line.

## 2018-10-26 NOTE — ED NOTES
Respiratory and Dr. Magdaleno at the head of the bed preparing for intubation, CPR remains in progress and pulse oximetry is unable to detect at this time. Pt.s skin is warm and femoral pulses palpable with compressions.  Pt. Has a PICC line to the left upper arm. Site is unwrapped and placement is verified with NS flush. Access redressed and intact.

## 2018-10-26 NOTE — PROCEDURES
"Tammie Saunders is a 67 y.o. female patient.    Temp: 99.5 °F (37.5 °C) (10/26/18 1104)  Pulse: 84 (10/26/18 1430)  Resp: (!) 24 (10/26/18 1430)  BP: (!) 105/51 (10/26/18 1430)  SpO2: 95 % (10/26/18 1430)  Weight: 121.6 kg (268 lb 1.3 oz) (10/26/18 1200)  Height: 5' 6" (167.6 cm) (10/26/18 1200)    PICC  Date/Time: 10/26/2018 2:05 PM  Consent Done: Yes  Time out: Immediately prior to procedure a time out was called to verify the correct patient, procedure, equipment, support staff and site/side marked as required  Indications: med administration and vascular access  Anesthesia: local infiltration  Local anesthetic: lidocaine 1% without epinephrine  Anesthetic Total (mL): 2  Preparation: skin prepped with ChloraPrep  Skin prep agent dried: skin prep agent completely dried prior to procedure  Sterile barriers: all five maximum sterile barriers used - cap, mask, sterile gown, sterile gloves, and large sterile sheet  Hand hygiene: hand hygiene performed prior to central venous catheter insertion  Location details: left brachial  Catheter type: double lumen  Catheter size: 5 Fr  Catheter Length: 43cm    Ultrasound guidance: yes  Needle advanced into vessel with real time Ultrasound guidance.  Guidewire confirmed in vessel.  Sterile sheath used.  Number of attempts: 1  Post-procedure: blood return through all ports, chlorhexidine patch and sterile dressing applied  Specimens: No  Implants: No  Assessment: placement verified by x-ray  Complications: none          Drew Fountain  10/26/2018  "

## 2018-10-26 NOTE — ED PROVIDER NOTES
Encounter Date: 10/25/2018    SCRIBE #1 NOTE: I, Felix Donovan, am scribing for, and in the presence of,  Dr. Alfred Magdaleno. I have scribed the entire note.       History     Chief Complaint   Patient presents with    Cardiac Arrest     pt to ED via [facility], EMS reports being called out for SOB states put pt on CPAP and was unable to gain IV access. On arrival to ED pt was limp and pulse was not felt, CPR initiated.      Time seen by provider: 10:15 PM    This is a 67 y.o. female with a PMHx of lung cancer presents via EMS due to cardiac arrest that occurred just prior to arrival. EMS reports being called out for SOB. On arrival, EMS found patient hypoxic with oxygen saturation in the 70's and was put on CPAP but stopped breathing as they were arriving to ED. Reports was unable to gain IV access. On arrival to ED, patient limp and without palpable pulse, CPR initiated.       The history is provided by the EMS personnel and a relative.     Review of patient's allergies indicates:  No Known Allergies  No past medical history on file.  No past surgical history on file.  No family history on file.  Social History     Tobacco Use    Smoking status: Not on file   Substance Use Topics    Alcohol use: Not on file    Drug use: Not on file     Review of Systems   Unable to perform ROS: Patient unresponsive       Physical Exam     Initial Vitals   BP Pulse Resp Temp SpO2   10/25/18 2253 10/25/18 2237 10/25/18 2255 -- 10/25/18 2253   (!) 232/108 (!) 136 (!) 24  99 %      MAP       --                Physical Exam    Nursing note and vitals reviewed.  Constitutional: She appears distressed.   Morbidly obese.   HENT:   Mouth/Throat: Oropharynx is clear and moist.   Partially digested food within oropharynx.   Eyes: Pupils are equal, round, and reactive to light.   Neck: Neck supple.   Cardiovascular:   There was no pulse or cardiac activity.   Pulmonary/Chest:   No spontaneous respirations, assisted with BVM.  Equal but  diminished breath sounds bilaterally.   Abdominal: She exhibits distension.   Neurological:   The patient is attended.  GCS is 3.   Skin:   Cool, dry         ED Course   Intubation  Date/Time: 10/26/2018 12:17 AM  Location procedure was performed: [facility] EMERGENCY DEPARTMENT  Performed by: Alfred Magdaleno MD  Authorized by: Alfred Magdaleno MD   Consent Done: Emergent Situation  Indications: respiratory failure  Intubation method: video-assisted  Preoxygenation: mask  Sedation: Ketamine.  Paralytic: none  Tube size: 7.5 mm  Number of attempts: 1  Ventilation between attempts: BVM  Post-procedure assessment: chest rise and CO2 detector  Cuff inflated: yes  ETT to lip: 24 cm  Tube secured with: ETT palomino  Chest x-ray interpreted by me.  Chest x-ray findings: endotracheal tube too low  Tube repositioned: tube repositioned successfully  Complications: No        Labs Reviewed   CBC W/ AUTO DIFFERENTIAL - Abnormal; Notable for the following components:       Result Value    RBC 3.12 (*)     Hemoglobin 8.6 (*)     Hematocrit 29.8 (*)     MCHC 28.9 (*)     RDW 15.2 (*)     All other components within normal limits   COMPREHENSIVE METABOLIC PANEL - Abnormal; Notable for the following components:    Glucose 491 (*)     Creatinine 1.8 (*)     Calcium 8.5 (*)     Total Protein 5.8 (*)     Albumin 2.7 (*)     AST 70 (*)     eGFR if  33 (*)     eGFR if non  28 (*)     All other components within normal limits    Narrative:     GLU critical result(s) called and verbal readback obtained from   Danitza Parker RN , 10/25/2018 23:51   TROPONIN I - Abnormal; Notable for the following components:    Troponin I 0.053 (*)     All other components within normal limits   B-TYPE NATRIURETIC PEPTIDE - Abnormal; Notable for the following components:     (*)     All other components within normal limits   D DIMER, QUANTITATIVE - Abnormal; Notable for the following components:    D-Dimer 7.90 (*)     All  other components within normal limits   ACETAMINOPHEN LEVEL - Abnormal; Notable for the following components:    Acetaminophen (Tylenol), Serum <3.0 (*)     All other components within normal limits   SALICYLATE LEVEL - Abnormal; Notable for the following components:    Salicylate Lvl <5.0 (*)     All other components within normal limits   ISTAT PROCEDURE - Abnormal; Notable for the following components:    POC PH 7.050 (*)     POC PCO2 94.2 (*)     POC PO2 220 (*)     POC TCO2 29 (*)     POC Hematocrit 30 (*)     All other components within normal limits   CULTURE, BLOOD   CULTURE, BLOOD   ALCOHOL,MEDICAL (ETHANOL)   PROTIME-INR   APTT   LIPASE   AMYLASE   MAGNESIUM   LACTIC ACID, PLASMA   TSH   URINALYSIS   LACTIC ACID, PLASMA     EKG Readings: (Independently Interpreted)   EKG: sinus tachycardia. RBBB. No STEMI       Imaging Results          CT Head Without Contrast (In process)                CTA Chest Non-Coronary (PE Study) (In process)                X-Ray Chest AP Portable (Final result)  Result time 10/25/18 23:09:40    Final result by Sarah Hawley MD (10/25/18 23:09:40)                 Impression:      See above.      Electronically signed by: Sarah Hawley MD  Date:    10/25/2018  Time:    23:09             Narrative:    EXAMINATION:  XR CHEST AP PORTABLE    CLINICAL HISTORY:  Failed or difficult intubation, initial encounter    TECHNIQUE:  Single frontal view of the chest was performed.    COMPARISON:  None    FINDINGS:  Evaluation limited by large body habitus and relative underpenetrated portable technique.    Left-sided PICC line is visualized with distal tip over the SVC projecting horizontally toward the right.  Endotracheal tube is visualized with distal tip approximately 1.5 cm above the liu.  Support device overlies the left chest wall.    Cardiac silhouette is upper limits of normal in size.  There is prominence of central pulmonary vasculature with bilateral perihilar opacity and  increased interstitial attenuation suggestive for pulmonary edema.  Consolidative changes are visualized in the right midlung zone perihilar region.  This could reflect pneumonia or asymmetric edema, with potential perihilar lesion not excluded.  No prior studies are available for comparison.  No evidence of pneumothorax or significant effusion.                              X-Rays:   Independently Interpreted Readings:   Other Readings:  CXR: ETT in place. consolidation right mid lung zone. Increased pulmonary vasculature.    Medical Decision Making:   Initial Assessment:   The patient arrived with CPR in progress for PEA arrest.  ACLS was continued.  The patient had a PICC line in the left arm that appeared to be partially removed.  I attempted to place IO lines in the bilateral tibia, but no bone marrow was aspirated and these were not used.  Ultimately, the PICC  line was successfully flushed and epinephrine was administered through the PICC line.  An LMA was placed during ACLS/CPR, but upon placement a large amount of partially digested food came up through the LMA tube.  This was removed and I continued BVM.  After 5-6 minutes of CPR and 1 mg of epinephrine, a pulse check revealed the patient was now in sinus tachycardia.  I continued assisted respirations with BVM and prepped the patient for intubation.  An oral airway was placed.  Pulse ox was attempted to be obtained on multiple extremities and forehead, but no tracing was initially obtained. The patient then began some spontaneous respirations and was biting down on the oral airway.  The patient then had several episodes of bradycardia, and had additional 1 mg of epinephrine was administered 0.5 mg at a time over several minutes as I was concerned she was going to arrest and I still had no palpable blood pressure.  She improved with this treatment and then I continued to proceed with intubation.  The patient required 100 mg of ketamine for airway  preparation.  After ketamine was administered, the patient was intubated with a 7.5 ET tube using a [facility] video laryngoscope.  The patient was successfully intubated on a single attempt.  Initial placement appeared to be right mainstem so the tube was partially removed until there were equal breath sounds. Subsequently, the patient had a pulse ox of 50-60%, but this gradually increased after airway suctioning of large amounts of partially digested food.  Given that the patient had no palpable blood pressure initially, I started Levophed.  Eventually the patient had hypertension with blood pressures of 200/100, and the Levophed was quickly weaned to off.  Sedation was accomplished with ketamine and propofol.  I was at the bedside continuously for over 1 hr during the initial evaluation.  Initial blood gas revealed a respiratory acidosis.  PaO2 was 200 on 100% oxygen.    My initial concern was aspiration, CHF, acute MI, hypoxic respiratory failure, hypercapnic respiratory failure, pneumonia or pulmonary embolism.  As the patient stabilized, she actually began to wake up and was following commands.  She was then further sedated for her comfort.  She will be admitted to the ICU.  Head CT revealed no intracranial bleed.  A noncontrast chest CT was obtained that revealed bilateral infiltrates.  The patient had an elevated lactate and received IV fluids as well as IV vancomycin and IV Zosyn, but her elevated lactate was likely secondary to hypoperfusion during her cardiac arrest.  I was concerned about a pulmonary embolism, but a CT of the chest could not be obtained due to poor renal function.  This was discussed with the admitting team.  They will obtain a V/Q scan.  If the head CT is negative for intracranial bleed, they will initiate continuous heparin.  I spent greater than 20 min counseling the family regarding the patient's grave condition and risk of hypoxic brain injury. I spent an additional 10 min discussing the  case with the admitting service.  I spread additional time at the bedside directly managing this patient.    Clinical Tests:   Lab Tests: Ordered and Reviewed  Radiological Study: Ordered and Reviewed  Medical Tests: Ordered and Reviewed  ED Management:  ACLS total time: 10 minutes.    11:28 PM   Patient is awake and following commands. I will increase sedation for ventilator management.     12:01 AM  Patient's GFR is 33. We will not do a chest CT for PE.  We will obtain a head CT and start heparin if negative.  I will do a noncontract chest CT to rule out infiltrate or pulmonary infarct.              Attending Attestation:         Attending Critical Care:   Critical Care Times:   ==============================================================  · Total Critical Care Time - exclusive of procedural time: 90 minutes.  ==============================================================  Critical care was necessary to treat or prevent imminent or life-threatening deterioration of the following conditions: cardiac arrest and respiratory failure.                  Clinical Impression:     1. PEA (Pulseless electrical activity)    2. Difficult intubation    3. Chest pain    4. Acute respiratory failure with hypoxia and hypercapnia                  I, Dr. Saurav Magdaleno, personally performed the services described in this documentation. All medical record entries made by the scribe were at my direction and in my presence.  I have reviewed the chart and agree that the record reflects my personal performance and is accurate and complete. Saurav Magdaleno MD.                 Alfred Magdaleno MD  10/26/18 020

## 2018-10-26 NOTE — CONSULTS
"  [facility]  Adult Nutrition  Consult Note    SUMMARY     Recommendations    Recommendation/Intervention:   1. If pt to remain intubated, initiate TF of Glucerna 1.5 at 10ml/hr and advance as tolerated to goal rate of 50ml/hr to provide 1800 kcal, 99g protein, & 911 ml free water.    Goals:   TF will be started within 24-48 hours  Nutrition Goal Status: new  Communication of RD Recs: reviewed with RN(Jacklyn)    Reason for Assessment  Reason for Assessment: consult(intubated)  Diagnosis: (cardiac arrest)  Relevant Medical History: DM, HTN, COPD, lung CA  General Information Comments: Pt intubated on vent. NO NFPE needed.  Nutrition Discharge Planning: d/c needs to be determined    Nutrition/Diet History  Food Preferences: unable to assess  Factors Affecting Nutritional Intake: on mechanical ventilation    Anthropometrics  Temp: 98.1 °F (36.7 °C)  Height Method: Estimated  Height: 5' 6" (167.6 cm)  Height (inches): 66 in  Weight Method: Bed Scale  Weight: 121.6 kg (268 lb 1.3 oz)  Weight (lb): 268.08 lb  Ideal Body Weight (IBW), Female: 130 lb  % Ideal Body Weight, Female (lb): 206.22 lb  BMI (Calculated): 43.4  BMI Grade: greater than 40 - morbid obesity     Lab/Procedures/Meds  Pertinent Labs Reviewed: reviewed  Pertinent Labs Comments: Crea 1.6H, Glu 383H, Ca 8.6L, Mg 1.5L, Alb 2.5L  Pertinent Medications Reviewed: reviewed  Pertinent Medications Comments: insulin, prednisone    Physical Findings/Assessment  Overall Physical Appearance: obese, on ventilator support  Tubes: orogastric tube  Oral/Mouth Cavity: (unable to assess)  Skin: (Arsh 13-intact)    Estimated/Assessed Needs  Weight Used For Calorie Calculations: 121.6 kg (268 lb 1.3 oz)  Energy Calorie Requirements (kcal): 1936  Energy Need Method: [facility]  Protein Requirements: 83g (1.4g/kg)  Weight Used For Protein Calculations: 59 kg (130 lb 1.1 oz)(IBW)  Fluid Need Method: RDA Method  RDA Method (mL): 1936  CHO Requirement: " 225g    Nutrition Prescription Ordered  Current Diet Order: NPO    Evaluation of Received Nutrient/Fluid Intake  I/O: 944/215  Energy Calories Required: not meeting needs  Protein Required: not meeting needs  Fluid Required: not meeting needs  Comments: No BM noted  % Intake of Estimated Energy Needs: Other: NPO  % Meal Intake: NPO    Nutrition Risk  Level of Risk/Frequency of Follow-up: (2xweekly)     Assessment and Plan    Cardiac arrest    Contributing Nutrition Diagnosis  Inadequate energy intake    Related to (etiology):   intubation    Signs and Symptoms (as evidenced by):   NPO    Interventions/Recommendations (treatment strategy):  See recs    Nutrition Diagnosis Status:   New          Monitor and Evaluation  Food and Nutrient Intake: energy intake  Food and Nutrient Adminstration: enteral and parenteral nutrition administration  Physical Activity and Function: nutrition-related ADLs and IADLs  Anthropometric Measurements: weight  Biochemical Data, Medical Tests and Procedures: electrolyte and renal panel  Nutrition-Focused Physical Findings: overall appearance     Nutrition Follow-Up  RD Follow-up?: Yes

## 2018-10-26 NOTE — PT/OT/SLP PROGRESS
Physical Therapy      Patient Name:  Tammie Saunders   MRN:  [id number]    Patient not seen today secondary to nursing hold reporting pt agitated. Will follow-up with evaluation as able.    Meagan Villalba, PT

## 2018-10-26 NOTE — PLAN OF CARE
Problem: Patient Care Overview  Goal: Plan of Care Review  Outcome: Ongoing (interventions implemented as appropriate)  VSS throughout shift. Able to stay off of pressor support. Weaned sedation, but unable to pass SBT. CT negative for PE, heparin gtt stopped and providing prophylactic heparin now. Pt able to follow commands and attempts to mouth words. Insulin being given for high blood sugars, UOP remains adequate. Safety maintained with side rails up, restraints in place to keep pt from pulling at lines, family in and out of room to visit with pt. Family and pt updated on plan of care.

## 2018-10-26 NOTE — PT/OT/SLP PROGRESS
Occupational Therapy  Missed Visit    Patient Name:  Tammie Saunders   MRN:  [id number]    Patient not seen today secondary to  nurse reports pt agitated and hold for now. Will follow-up.    Jefferson Caro OT  10/26/2018

## 2018-10-26 NOTE — ED NOTES
Pt. Awake and restless, agitated and thrashing about. Dr. Magdaleno at bedside. Propofol 50mg given iv per md for sedation.

## 2018-10-26 NOTE — ED NOTES
Pt. Is pulseless and not breathing. CPR initiated. Dr. Magdaleno, respiratory and nursing staff at the bedside. Pulse oximetry is unable to be detected at this time. Dr. Magdaleno initiates an IO access to right lower leg. IO is unable to be flushed with NS. IO to left leg initiated. Site is unable to be flushed with NS.

## 2018-10-26 NOTE — ED NOTES
Pt. Began to become bradycardic. HR 64 from 136. Verbal order received to give Epinephrine 1/2 ampule.

## 2018-10-26 NOTE — HPI
"67 year-old female was admitted on 10/25/18 for pulseless electrical activity cardiac arrest. Consult is for "patient on chemo prior to arrival." Comorbid conditions include non-small cell lung cancer of the right lung on third-line docetaxel chemotherapy, type 2 diabetes mellitus with long-term use of insulin and hyperglycemia.     Regarding her lung cancer diagnosis, she is treated at [facility]. Information provided by her daughters. Sounds like she just started with her first dose of docetaxel earlier in October. Prior to that, she received pembrolizumab in the second-line setting. It is unclear what she received in the first-line setting, but it was likely a platinum doublet.  "

## 2018-10-26 NOTE — PLAN OF CARE
Problem: Patient Care Overview (Adult)  Goal: Plan of Care Review  Outcome: Ongoing (interventions implemented as appropriate)  Recommendation/Intervention:   1. If pt to remain intubated, initiate TF of Glucerna 1.5 at 10ml/hr and advance as tolerated to goal rate of 50ml/hr to provide 1800 kcal, 99g protein, & 911 ml free water.    Goals:   TF will be started within 24-48 hours  Nutrition Goal Status: new  Communication of RD Recs: reviewed with RNNicole)

## 2018-10-26 NOTE — ED TRIAGE NOTES
Pt. To the ER via [facility] with c/o having shortness of breath. Pt. Transported onto ER stretcher and stopped breathing. CPR initiated and MD, respiratory and nursing staff at the bedside.

## 2018-10-26 NOTE — PT/OT/SLP PROGRESS
Occupational Therapy      Patient Name:  Tammie Saunders   MRN:  [id number]    Patient following commands, moving all extremities.  Pt agitated, wants ET tube removed.  Declined EOB sitting and further activity. Will follow-up post extubation Saturday.    Jefferson Crao OT  10/26/2018

## 2018-10-26 NOTE — PLAN OF CARE
Spoke to patient's daughter Irene Yao [phone]    Patient lives at home with daughter Nicole  Patient is [facility] patient- spoke to  Kelli [phone]    Patient has oxygen with [employer] [facility]       10/26/18 1418   Discharge Assessment   Assessment Type Discharge Planning Assessment   Confirmed/corrected address and phone number on facesheet? Yes   Assessment information obtained from? Patient   Prior to hospitilization cognitive status: Coma/Sedated/Intubated   Prior to hospitalization functional status: Independent;Assistive Equipment   Current cognitive status: Alert/Oriented   Current Functional Status: Independent   Lives With alone   Able to Return to Prior Arrangements unable to determine at this time (comments)   Patient's perception of discharge disposition home or selfcare   Readmission Within The Last 30 Days no previous admission in last 30 days   Patient currently being followed by outpatient case management? No   Patient currently receives any other outside agency services? No   Equipment Currently Used at Home oxygen   Do you have any problems affording any of your prescribed medications? No   Is the patient taking medications as prescribed? yes   Does the patient have transportation home? Yes   Transportation Available none   Does the patient receive services at the [facility]? No   Discharge Plan A Home;Home with family   Discharge Plan B Skilled Nursing Facility   Patient/Family In Agreement With Plan yes     Nazia Hugo, RN, CCM, CMSRN  RN Transition Navigator  [phone]

## 2018-10-26 NOTE — ED NOTES
BP- 179/94, cardiac monitor shows sinus rhythm with HR-81. Pulse oximetry monitors are 100% on 100% oxygen. Levophed infusion decreased to 0.05 mcg/kg/min.

## 2018-10-26 NOTE — H&P
[facility] Medicine H&P Note     Admitting Team: [facility] Hospitalist Team B  Attending Physician: Fabricio Keyes MD  Resident: Edie Feliciano  Intern: Abdi Randle    Date of Admit: 10/25/2018    Chief Complaint     Cardiac Arrest (pt to ED via [facility], EMS reports being called out for SOB states put pt on CPAP and was unable to gain IV access. On arrival to ED pt was limp and pulse was not felt, CPR initiated. )   for 1 day    Subjective:      History of Present Illness:  Tammie Saunders is a 67 y.o. female who  has no past medical history on file.. The patient presented to [facility] on 10/25/2018 with a primary complaint of Cardiac Arrest (pt to ED via [facility], EMS reports being called out for SOB states put pt on CPAP and was unable to gain IV access. On arrival to ED pt was limp and pulse was not felt, CPR initiated. )      Ms. Saunders is a 66 y/o female with PMH significant for DM2, HTN, COPD, lung cancer (currently on chemo) who presented to [facility] in PEA arrest. Per report from ED, pt was feeling SOB at home. EMS was called to the house and found patient satting in the 70's. She was placed on CPAP and transported to the hospital. On arrival, the patient was found to be in PEA arrest. 2 rounds of chest compressions and 1 mg of epi were given before ROSC was achieved. Bedside echo was unremarkable (per ED physician) and EKG showed RBBB. I spoke with one of the patient's daughters, Nicole ([phone]), who was present when the patient developed symptoms. She states that the patient acutely developed SOB at home. There were no other antecedent symptoms such as headache, chest pain, back pain, nausea, vomiting, SOB.     Per the patient's family, Ms. Saunders has a history of lung cancer and is currently on chemotherapy. She is followed by oncology at [facility]. She has been treated with chemoradiation and immunotherapy in the past. The patient is also on home 2 L home oxygen.    Past  Medical History:  DM2  HTN  COPD  Lung CA    Past Surgical History:  Cholecystectomy  Bypass left LE    Allergies:  Review of patient's allergies indicates:  No Known Allergies    Home Medications:  Prior to Admission medications    Not on File       Family History:  No family history on file.    Social History:  Social History     Tobacco Use    Smoking status: Not on file   Substance Use Topics    Alcohol use: Not on file    Drug use: Not on file       Review of Systems:  ROS as stated in HPI and obtained from patient's family due to patient's condition (sedated and on vent)    Health Maintaince :   Primary Care Physician: Tanner    Immunizations:   TDap up to date   Flu up to date   Pna up to date     Cancer Screening:  Unable to obtain patient's cancer screening history due to patient's mental status     Objective:   Last 24 Hour Vital Signs:  BP  Min: 200/88  Max: 242/109  Pulse  Av.2  Min: 109  Max: 136  Resp  Av.3  Min: 23  Max: 32  SpO2  Av.2 %  Min: 96 %  Max: 99 %  Weight  Av.1 kg (300 lb)  Min: 136.1 kg (300 lb)  Max: 136.1 kg (300 lb)  There is no height or weight on file to calculate BMI.  No intake/output data recorded.    Physical Examination:  Gen: Obese. On vent and in distress   HEENT: NC/AT. Conjuctiva clear, sclera anicteric.   Pulm: Course breath sounds bilaterally   CV: tachycardic, regular, no S3/S4, M/R/G  Abd: Soft, non-distended  MSK: No gross deformity. No edema   Skin: No rashes or lesions.   Neuro: Following commands in all four.     Laboratory:  Most Recent Data:  CBC:   Lab Results   Component Value Date    WBC 8.23 10/25/2018    HGB 8.6 (L) 10/25/2018    HCT 29.8 (L) 10/25/2018     10/25/2018    MCV 96 10/25/2018    RDW 15.2 (H) 10/25/2018     BMP:   Lab Results   Component Value Date     10/25/2018    K 4.3 10/25/2018    CL 97 10/25/2018    CO2 26 10/25/2018    BUN 19 10/25/2018    CREATININE 1.8 (H) 10/25/2018     (HH) 10/25/2018     CALCIUM 8.5 (L) 10/25/2018    MG 1.9 10/25/2018     LFTs:   Lab Results   Component Value Date    PROT 5.8 (L) 10/25/2018    ALBUMIN 2.7 (L) 10/25/2018    BILITOT 0.4 10/25/2018    AST 70 (H) 10/25/2018    ALKPHOS 107 10/25/2018    ALT 42 10/25/2018     Coags:   Lab Results   Component Value Date    INR 1.1 10/25/2018     FLP: No results found for: CHOL, HDL, LDLCALC, TRIG, CHOLHDL  DM:   Lab Results   Component Value Date    CREATININE 1.8 (H) 10/25/2018     Thyroid: No results found for: TSH, FREET4, O9FPQNG, T8UIYKS, THYROIDAB  Anemia: No results found for: IRON, TIBC, FERRITIN, GJRXHSMV29, FOLATE  Cardiac:   Lab Results   Component Value Date    TROPONINI 0.053 (H) 10/25/2018     (H) 10/25/2018     Urinalysis: No results found for: LABURIN, COLORU, CLARITYU, SPECGRAV, LABSPEC, NITRITE, PROTEINUR, GLUCOSEU, KETONESU, UROBILINOGEN, BILIRUBINUR, BLOODU, RBCU, WBCUA    Trended Lab Data:  Recent Labs   Lab 10/25/18  2256 10/25/18  2309   WBC  --  8.23   HGB  --  8.6*   HCT 30* 29.8*   PLT  --  182   MCV  --  96   RDW  --  15.2*   NA  --  138   K  --  4.3   CL  --  97   CO2  --  26   BUN  --  19   CREATININE  --  1.8*   GLU  --  491*   PROT  --  5.8*   ALBUMIN  --  2.7*   BILITOT  --  0.4   AST  --  70*   ALKPHOS  --  107   ALT  --  42       Trended Cardiac Data:  Recent Labs   Lab 10/25/18  2309   TROPONINI 0.053*   *       Microbiology Data:  Blood cx 10/26: pending    Other Results:  EKG (my interpretation): RBBB    Radiology:  Imaging Results          CT Head Without Contrast (In process)                CT Chest Without Contrast (In process)  Result time 10/26/18 00:02:16   Procedure changed from CTA Chest Non-Coronary (PE Study)                X-Ray Chest AP Portable (Final result)  Result time 10/25/18 23:09:40    Final result by Sarah Hawley MD (10/25/18 23:09:40)                 Impression:      See above.      Electronically signed by: Sarah Hawley MD  Date:    10/25/2018  Time:    23:09              Narrative:    EXAMINATION:  XR CHEST AP PORTABLE    CLINICAL HISTORY:  Failed or difficult intubation, initial encounter    TECHNIQUE:  Single frontal view of the chest was performed.    COMPARISON:  None    FINDINGS:  Evaluation limited by large body habitus and relative underpenetrated portable technique.    Left-sided PICC line is visualized with distal tip over the SVC projecting horizontally toward the right.  Endotracheal tube is visualized with distal tip approximately 1.5 cm above the liu.  Support device overlies the left chest wall.    Cardiac silhouette is upper limits of normal in size.  There is prominence of central pulmonary vasculature with bilateral perihilar opacity and increased interstitial attenuation suggestive for pulmonary edema.  Consolidative changes are visualized in the right midlung zone perihilar region.  This could reflect pneumonia or asymmetric edema, with potential perihilar lesion not excluded.  No prior studies are available for comparison.  No evidence of pneumothorax or significant effusion.                                   Assessment:     Tammie Saunders is a 67 y.o. female with:  Patient Active Problem List    Diagnosis Date Noted    Cardiac arrest 10/26/2018    COPD (chronic obstructive pulmonary disease) 10/26/2018    Acute hypercapnic respiratory failure 10/26/2018    Malignant neoplasm of overlapping sites of right lung 10/26/2018    Other heart failure 10/26/2018    Essential hypertension 10/26/2018    Type 2 diabetes mellitus, with long-term current use of insulin 10/26/2018    PEA (Pulseless electrical activity) 10/26/2018        Plan:     # PEA cardiac arrest  - Received 2 rounds of chest compressions, 1 mg epinephrine before achieving ROSC  - Following commands after ROSC  - Likely due to hypoxemia. Had SPO2 in the 70's in the field prior to the arrest  - Start heparin ggt now.  - TTE, trend trops/EKG, duplex LE US. Holding off getting CTA chest for  PE as patient had marked renal insufficiency on presentation (Cr 1.8, GFR 32). Can't get V/Q scan as patient is on vent. Will discuss CTA with team in the morning.  - BNP elevated at 161. Patient is morbidly obese, so mildly elevated BNP is of little utility.      # Acute combined respiratory failure   - EMS reports SPO2 in the 70s in the field  - AB.05 pH, 94 PCO2, 220 O2, BE -4 (post arrest)  -  PE vs MI vs intrinsic pulmonary disease (COPD and cancer)  - Workup as stated above    # Lung cancer  - Family reports patient currently receiving chemotherapy at [facility] for lung CA. Has had chemoradiation and immunotherapy in the past.    # DM2, insulin dependent   - On Tresiba 80 U nightly and Novolog 20 U with lunch and sliding scale at home  - A1c 10.2  - Start levemir 20 U and SSI now. Glucose checks q4h while NPO    # COPD  - On 2 L home oxygen  - Duonebs at home. Continue.    # HTN   - Home meds: Carvedilol 25 mg twice daily. Hold.         Code Status:     Full    Abdi Randle MD  [facility]-I    [facility] Medicine Hospitalist Pager numbers:   [facility] Hospitalist Medicine Team A (Kirt/Betty): [phone]  [facility] Hospitalist Medicine Team B (Wali/Twila):  [phone]

## 2018-10-26 NOTE — SUBJECTIVE & OBJECTIVE
The daughters are concerned that the docetaxel perhaps caused her current cardiac episode.    Oncology Treatment Plan:   [No treatment plan]    Medications:  Continuous Infusions:   norepinephrine bitartrate-D5W Stopped (10/26/18 0717)    propofol Stopped (10/26/18 1223)     Scheduled Meds:   albuterol-ipratropium  3 mL Nebulization Q4H    heparin (porcine)  5,000 Units Subcutaneous Q8H    insulin detemir U-100  20 Units Subcutaneous BID    predniSONE  60 mg Per NG tube Daily    sodium chloride 0.9%  10 mL Intravenous Q6H     PRN Meds:dextrose 50%, dextrose 50%, glucagon (human recombinant), glucose, glucose, insulin aspart U-100, labetalol, Flushing PICC Protocol **AND** sodium chloride 0.9% **AND** sodium chloride 0.9%, sodium chloride 0.9%     Review of patient's allergies indicates:  No Known Allergies     No past medical history on file.  No past surgical history on file.  Family History     None        Tobacco Use    Smoking status: Not on file   Substance and Sexual Activity    Alcohol use: Not on file    Drug use: Not on file    Sexual activity: Not on file     Review of Systems   Unable to perform ROS: Intubated     Objective:     Vital Signs (Most Recent):  Temp: 99.5 °F (37.5 °C) (10/26/18 1104)  Pulse: 94 (10/26/18 1530)  Resp: (!) 22 (10/26/18 1530)  BP: 132/66 (10/26/18 1530)  SpO2: 97 % (10/26/18 1530) Vital Signs (24h Range):  Temp:  [97.4 °F (36.3 °C)-99.5 °F (37.5 °C)] 99.5 °F (37.5 °C)  Pulse:  [] 94  Resp:  [7-55] 22  SpO2:  [91 %-100 %] 97 %  BP: ()/() 132/66     Weight: 121.6 kg (268 lb 1.3 oz)  Body mass index is 43.27 kg/m².  Body surface area is 2.38 meters squared.      Intake/Output Summary (Last 24 hours) at 10/26/2018 1627  Last data filed at 10/26/2018 1600  Gross per 24 hour   Intake 1145.05 ml   Output 585 ml   Net 560.05 ml       Physical Exam   Constitutional: She appears well-developed and well-nourished.   HENT:   Head: Normocephalic and atraumatic.    Throat is intubated   Eyes: No scleral icterus.   Neck: Normal range of motion. Neck supple.   Cardiovascular: Regular rhythm.   tachycardic   Pulmonary/Chest:   Intubated, coarse breath sounds   Abdominal: Soft.   Musculoskeletal: She exhibits no edema.   Neurological:   Intubated and sedated   Skin: Skin is warm and dry.   Psychiatric:   Unable to assess   Nursing note and vitals reviewed.    Significant Labs:   Labs have been reviewed.    Diagnostic Results:  CTA chest (10/26/18): I have personally reviewed the images  - There is good opacification of the pulmonary arterial system, there is no evidence of pulmonary embolus.  The thoracic aorta is of normal caliber, there is mild atherosclerotic plaque.  There are coronary artery calcifications.  No pericardial effusion.  Upper normal size mediastinal and hilar nodes consistent with the likely reactive nodes.  ET tube distal tip distal trachea.  There is a small right pleural effusion.  - There is thickening mostly noted of the peribronchovascular bundle centrally bilaterally.  There is patchy subsegmental areas of airspace consolidation and small subcentimeter nodules right upper lobe.  - There is middle lobe airspace consolidation more so centrally.  - Within the right lower lobe, there are subsegmental areas of airspace consolidation and volume loss involving more so the superior segment and additional subsegmental areas of patchy opacity in the remainder of the right lower lobe.  - The left upper lobe demonstrate few subcentimeter nodularity.  Very minimal subsegmental atelectasis at the lingula.  Airspace consolidation and volume loss noted involving more so the superior segment of the left lower lobe and patchy subsegmental areas of the basal segments.  There is no left pleural effusion.  The axillary regions appear normal.  The NG tube distal tip is seen within the stomach.  The osseous structures appear normal.

## 2018-10-26 NOTE — ED NOTES
Patient placed on continuous cardiac monitor, automatic blood pressure cuff and continuous pulse oximeter. Pt. Arrives via [facility] ems with reported c/o sob on cpap but still hypoxic and unable to establish iv access. On arrival with ems pt. Is apneic and pulseless. Pt. Moved to er stretcher rm% and cpr and acls interventions initiated. Dr. Magdaleno at bedside.  The patient has a picc line in lt. Arm that flushes easily, site and catheter appears to be appropriate.

## 2018-10-26 NOTE — ED NOTES
Cardiac monitor shows sinus tachycardia with HR-122. Skin is dry and warm. Palpable radial and femoral pulses present. CPR stopped.

## 2018-10-26 NOTE — ASSESSMENT & PLAN NOTE
- patient of an oncologist at [facility].  - patient had just started docetaxel earlier this month. She had received one dose of docetaxel so far.  - it is unclear whether the docetaxel contributed to her cardiac event. There are reports of decreased ejection fraction with docetaxel, but that is rare. There can be hypotension associated with docetaxel, but that is typically during administration or soon after.  - I would be hesitant to re-challenge with docetaxel, but I will defer that decision to her primary oncologist.  - overall, her prognosis from a lung cancer standpoint is poor, being that she is on third-line therapy. I do not have all the information about her disease (molecular markers, PD-L1 status, mutation status), so it is hard to make a true prognostic estimate. However, typically responses on third-line therapy are short (a few months).

## 2018-10-26 NOTE — CONSULTS
Reason for consult  Post PEA arrest    History of present illness:   Tammie Saunders is a 67 y.o. year old female with a past medical history of DM2, HTN, COPD, Lung cancer on chemo presented yesterday post cardiac arrest.   Patient is intubated and sedated and hx is gathered from chart checking. She was feeling SOB at home and EMS called and was found to be hypoxic with saturation in the 70's. On arrival to the  ER, she was unresponsive and was found to be in PEA arrest. CPR done for two rounds and ROSC was achieved.   No complaints of chest pain, dizziness, n/v prior to yesterday's event.   She follows with oncology at [facility] and is on chemo. Has radiation and immunotherapy hx in the past.     No known documented cardiac history.     No past medical history on file.  Medications:   albuterol-ipratropium  3 mL Nebulization Q4H    insulin detemir U-100  20 Units Subcutaneous BID    predniSONE  60 mg Per NG tube Daily     Allergies:  Review of patient's allergies indicates:  No Known Allergies    Social history:  Social History     Tobacco Use    Smoking status: Not on file   Substance Use Topics    Alcohol use: Not on file    Drug use: Not on file     Family history:  family history is not on file.    Surgical history:  No past surgical history on file.  Review of systems:  Rest is negative except as in HPI.    Physical Exam:  Temp:  [97.4 °F (36.3 °C)-98.1 °F (36.7 °C)] 98.1 °F (36.7 °C)  Pulse:  [] 87  Resp:  [7-55] 24  SpO2:  [91 %-100 %] 94 %  BP: ()/() 109/53  General: Intubated and sedated.   Chest: Mechanical breath sounds bilaterally  CVS: regular rate and rhythm, no murmurs, rubs, or gallops, no carotid bruits, JVP 7 cm, radial, femoral, and DP 2+ bilaterally  Ext: no edema    Lab Results   Component Value Date    TROPONINI 0.558 (H) 10/26/2018    TROPONINI 0.589 (H) 10/26/2018    TROPONINI 0.232 (H) 10/26/2018     (H) 10/25/2018    INR 1.1 10/25/2018    HGB 8.0 (L)  10/26/2018    HCT 27.0 (L) 10/26/2018     (L) 10/26/2018    CREATININE 1.6 (H) 10/26/2018    K 3.5 10/26/2018       EKG:  EKG: sinus rhythm, TWI inferolateral leads, LVH.     Echo:  Pending    Ct chest:     Bilateral more central airspace consolidation, unchanged from the prior recent study most suggestive of pneumonia, aspiration or hemorrhage with volume loss.  Underlying neoplasm not excluded.    No evidence of pulmonary embolus.    Small right pleural effusion.    Upper normal size nodes within the mediastinum and hilar region could be reactive to the underlying inflammatory process, could also be metastatic.    Assessment/Plan:  67 y.o. year old female with hx of DM2, HTN, COPD, Lung cancer on chemo presented with PEA arrest triggered by hypoxia. PE ruled out by CTA. Post arrest abg with severe respiratory acidosis. Possible hypoxia from lung Ca, PNA and COPD ex.     Mild troponin rise post cardiac arrest. Likely demand supply mismatch.   -will review Echocardiogram.    Abx for possible PNA as per primary   On COPD mx.   Supportive care as per primary.

## 2018-10-27 LAB
ALBUMIN SERPL BCP-MCNC: 2.4 G/DL
ALP SERPL-CCNC: 80 U/L
ALT SERPL W/O P-5'-P-CCNC: 25 U/L
ANION GAP SERPL CALC-SCNC: 9 MMOL/L
AST SERPL-CCNC: 18 U/L
BASOPHILS # BLD AUTO: 0 K/UL
BASOPHILS NFR BLD: 0 %
BILIRUB SERPL-MCNC: 0.5 MG/DL
BUN SERPL-MCNC: 33 MG/DL
CALCIUM SERPL-MCNC: 9 MG/DL
CHLORIDE SERPL-SCNC: 100 MMOL/L
CO2 SERPL-SCNC: 31 MMOL/L
CREAT SERPL-MCNC: 1.8 MG/DL
DIASTOLIC DYSFUNCTION: YES
DIFFERENTIAL METHOD: ABNORMAL
EOSINOPHIL # BLD AUTO: 0 K/UL
EOSINOPHIL NFR BLD: 0 %
ERYTHROCYTE [DISTWIDTH] IN BLOOD BY AUTOMATED COUNT: 15.8 %
EST. GFR  (AFRICAN AMERICAN): 33 ML/MIN/1.73 M^2
EST. GFR  (NON AFRICAN AMERICAN): 28 ML/MIN/1.73 M^2
GLUCOSE SERPL-MCNC: 166 MG/DL
HCT VFR BLD AUTO: 23.9 %
HGB BLD-MCNC: 7.6 G/DL
LACTATE SERPL-SCNC: 1.5 MMOL/L
LYMPHOCYTES # BLD AUTO: 0.6 K/UL
LYMPHOCYTES NFR BLD: 4.8 %
MAGNESIUM SERPL-MCNC: 2.1 MG/DL
MCH RBC QN AUTO: 28 PG
MCHC RBC AUTO-ENTMCNC: 31.8 G/DL
MCV RBC AUTO: 88 FL
MONOCYTES # BLD AUTO: 0.6 K/UL
MONOCYTES NFR BLD: 5.3 %
NEUTROPHILS # BLD AUTO: 10.6 K/UL
NEUTROPHILS NFR BLD: 89.9 %
PHOSPHATE SERPL-MCNC: 3.3 MG/DL
PLATELET # BLD AUTO: 171 K/UL
PMV BLD AUTO: 10 FL
POCT GLUCOSE: 100 MG/DL (ref 70–110)
POCT GLUCOSE: 146 MG/DL (ref 70–110)
POCT GLUCOSE: 180 MG/DL (ref 70–110)
POCT GLUCOSE: 244 MG/DL (ref 70–110)
POCT GLUCOSE: 285 MG/DL (ref 70–110)
POCT GLUCOSE: 291 MG/DL (ref 70–110)
POTASSIUM SERPL-SCNC: 3.8 MMOL/L
PROT SERPL-MCNC: 6 G/DL
RBC # BLD AUTO: 2.71 M/UL
RETIRED EF AND QEF - SEE NOTES: 35 (ref 55–65)
SODIUM SERPL-SCNC: 140 MMOL/L
TOXIC GRANULES BLD QL SMEAR: PRESENT
WBC # BLD AUTO: 11.79 K/UL

## 2018-10-27 PROCEDURE — 94003 VENT MGMT INPAT SUBQ DAY: CPT

## 2018-10-27 PROCEDURE — G8979 MOBILITY GOAL STATUS: HCPCS | Mod: CH

## 2018-10-27 PROCEDURE — G8978 MOBILITY CURRENT STATUS: HCPCS | Mod: CK

## 2018-10-27 PROCEDURE — 27000190 HC CPAP FULL FACE MASK W/VALVE

## 2018-10-27 PROCEDURE — 63600175 PHARM REV CODE 636 W HCPCS: Performed by: STUDENT IN AN ORGANIZED HEALTH CARE EDUCATION/TRAINING PROGRAM

## 2018-10-27 PROCEDURE — 99900035 HC TECH TIME PER 15 MIN (STAT)

## 2018-10-27 PROCEDURE — 25000242 PHARM REV CODE 250 ALT 637 W/ HCPCS: Performed by: STUDENT IN AN ORGANIZED HEALTH CARE EDUCATION/TRAINING PROGRAM

## 2018-10-27 PROCEDURE — 63600175 PHARM REV CODE 636 W HCPCS: Performed by: HOSPITALIST

## 2018-10-27 PROCEDURE — 25000003 PHARM REV CODE 250: Performed by: INTERNAL MEDICINE

## 2018-10-27 PROCEDURE — 97161 PT EVAL LOW COMPLEX 20 MIN: CPT

## 2018-10-27 PROCEDURE — 83735 ASSAY OF MAGNESIUM: CPT

## 2018-10-27 PROCEDURE — 27000221 HC OXYGEN, UP TO 24 HOURS

## 2018-10-27 PROCEDURE — 84100 ASSAY OF PHOSPHORUS: CPT

## 2018-10-27 PROCEDURE — 94640 AIRWAY INHALATION TREATMENT: CPT

## 2018-10-27 PROCEDURE — A4216 STERILE WATER/SALINE, 10 ML: HCPCS | Performed by: INTERNAL MEDICINE

## 2018-10-27 PROCEDURE — 85025 COMPLETE CBC W/AUTO DIFF WBC: CPT

## 2018-10-27 PROCEDURE — 94660 CPAP INITIATION&MGMT: CPT

## 2018-10-27 PROCEDURE — 97165 OT EVAL LOW COMPLEX 30 MIN: CPT

## 2018-10-27 PROCEDURE — 83605 ASSAY OF LACTIC ACID: CPT

## 2018-10-27 PROCEDURE — 20000000 HC ICU ROOM

## 2018-10-27 PROCEDURE — 25000003 PHARM REV CODE 250: Performed by: STUDENT IN AN ORGANIZED HEALTH CARE EDUCATION/TRAINING PROGRAM

## 2018-10-27 PROCEDURE — 94761 N-INVAS EAR/PLS OXIMETRY MLT: CPT

## 2018-10-27 PROCEDURE — 80053 COMPREHEN METABOLIC PANEL: CPT

## 2018-10-27 RX ORDER — SODIUM CHLORIDE, SODIUM LACTATE, POTASSIUM CHLORIDE, CALCIUM CHLORIDE 600; 310; 30; 20 MG/100ML; MG/100ML; MG/100ML; MG/100ML
INJECTION, SOLUTION INTRAVENOUS CONTINUOUS
Status: DISCONTINUED | OUTPATIENT
Start: 2018-10-27 | End: 2018-10-27

## 2018-10-27 RX ORDER — FUROSEMIDE 10 MG/ML
80 INJECTION INTRAMUSCULAR; INTRAVENOUS 2 TIMES DAILY
Status: DISCONTINUED | OUTPATIENT
Start: 2018-10-27 | End: 2018-10-28

## 2018-10-27 RX ORDER — FUROSEMIDE 10 MG/ML
40 INJECTION INTRAMUSCULAR; INTRAVENOUS ONCE
Status: COMPLETED | OUTPATIENT
Start: 2018-10-27 | End: 2018-10-27

## 2018-10-27 RX ORDER — RAMELTEON 8 MG/1
8 TABLET ORAL NIGHTLY PRN
Status: DISCONTINUED | OUTPATIENT
Start: 2018-10-27 | End: 2018-10-29 | Stop reason: HOSPADM

## 2018-10-27 RX ADMIN — HEPARIN SODIUM 5000 UNITS: 5000 INJECTION, SOLUTION INTRAVENOUS; SUBCUTANEOUS at 01:10

## 2018-10-27 RX ADMIN — FUROSEMIDE 80 MG: 10 INJECTION, SOLUTION INTRAMUSCULAR; INTRAVENOUS at 11:10

## 2018-10-27 RX ADMIN — Medication 10 ML: at 05:10

## 2018-10-27 RX ADMIN — IPRATROPIUM BROMIDE AND ALBUTEROL SULFATE 3 ML: .5; 3 SOLUTION RESPIRATORY (INHALATION) at 08:10

## 2018-10-27 RX ADMIN — FUROSEMIDE 40 MG: 10 INJECTION, SOLUTION INTRAMUSCULAR; INTRAVENOUS at 03:10

## 2018-10-27 RX ADMIN — HEPARIN SODIUM 5000 UNITS: 5000 INJECTION, SOLUTION INTRAVENOUS; SUBCUTANEOUS at 05:10

## 2018-10-27 RX ADMIN — INSULIN DETEMIR 20 UNITS: 100 INJECTION, SOLUTION SUBCUTANEOUS at 08:10

## 2018-10-27 RX ADMIN — RAMELTEON 8 MG: 8 TABLET, FILM COATED ORAL at 04:10

## 2018-10-27 RX ADMIN — IPRATROPIUM BROMIDE AND ALBUTEROL SULFATE 3 ML: .5; 3 SOLUTION RESPIRATORY (INHALATION) at 04:10

## 2018-10-27 RX ADMIN — HEPARIN SODIUM 5000 UNITS: 5000 INJECTION, SOLUTION INTRAVENOUS; SUBCUTANEOUS at 09:10

## 2018-10-27 RX ADMIN — SODIUM CHLORIDE, SODIUM LACTATE, POTASSIUM CHLORIDE, AND CALCIUM CHLORIDE: .6; .31; .03; .02 INJECTION, SOLUTION INTRAVENOUS at 01:10

## 2018-10-27 RX ADMIN — INSULIN DETEMIR 20 UNITS: 100 INJECTION, SOLUTION SUBCUTANEOUS at 09:10

## 2018-10-27 RX ADMIN — FUROSEMIDE 80 MG: 10 INJECTION, SOLUTION INTRAMUSCULAR; INTRAVENOUS at 05:10

## 2018-10-27 RX ADMIN — IPRATROPIUM BROMIDE AND ALBUTEROL SULFATE 3 ML: .5; 3 SOLUTION RESPIRATORY (INHALATION) at 11:10

## 2018-10-27 RX ADMIN — INSULIN ASPART 2 UNITS: 100 INJECTION, SOLUTION INTRAVENOUS; SUBCUTANEOUS at 12:10

## 2018-10-27 RX ADMIN — IPRATROPIUM BROMIDE AND ALBUTEROL SULFATE 3 ML: .5; 3 SOLUTION RESPIRATORY (INHALATION) at 07:10

## 2018-10-27 RX ADMIN — PREDNISONE 60 MG: 20 TABLET ORAL at 08:10

## 2018-10-27 RX ADMIN — Medication 10 ML: at 12:10

## 2018-10-27 RX ADMIN — Medication 10 ML: at 11:10

## 2018-10-27 RX ADMIN — INSULIN ASPART 2 UNITS: 100 INJECTION, SOLUTION INTRAVENOUS; SUBCUTANEOUS at 06:10

## 2018-10-27 RX ADMIN — INSULIN ASPART 2 UNITS: 100 INJECTION, SOLUTION INTRAVENOUS; SUBCUTANEOUS at 05:10

## 2018-10-27 RX ADMIN — IPRATROPIUM BROMIDE AND ALBUTEROL SULFATE 3 ML: .5; 3 SOLUTION RESPIRATORY (INHALATION) at 03:10

## 2018-10-27 NOTE — NURSING
Physician notified regarding decreased UO below 30ml/hr. No new orders given at this time. Will continue to monitor.

## 2018-10-27 NOTE — PROGRESS NOTES
"[facility]  Consult Follow Up    Primary Attending:  Fabricio Keyes MD   Consultant Attending: Morales Miranda MD   Consultant Fellow: Ever Kenyon MD     Chief Complaint/Reason for Consult      Respiratroy failure, PEA cardiac arrest     Interval History      Off pressors and sedation this AM. Dose of lasix received at 0300. Has been on SBT since 0750, my evaluation occurred at 0840. She has been on minimal ventilator settings through the night and has been hemodynamically stable and without fever. She is able to follow commands easily for family and staff.      Updated Review of Systems      Unable to obtain on ventilator     Medications and nursing orders have been reviewed    On Examination     BP (!) 112/56 (BP Location: Right arm, Patient Position: Lying)   Pulse 79   Temp 98.7 °F (37.1 °C) (Oral)   Resp (!) 23   Ht 5' 6" (1.676 m)   Wt 121.6 kg (268 lb 1.3 oz)   SpO2 97%   BMI 43.27 kg/m²     Physical Exam   Constitutional: She appears well-developed and well-nourished. No distress.   HENT:   Head: Normocephalic and atraumatic.   Neck: Neck supple.   Cardiovascular: Normal rate, regular rhythm, normal heart sounds and intact distal pulses. Exam reveals no friction rub.   No murmur heard.  Pulmonary/Chest: Effort normal. She has wheezes.   Abdominal: Soft. Bowel sounds are normal. She exhibits no distension. There is no tenderness.   Musculoskeletal: She exhibits edema.   Neurological: She is alert.   Intubated. Follows commands with all extremities.    Skin: Capillary refill takes less than 2 seconds.   Psychiatric: Her behavior is normal.   Vitals reviewed.      All recent labs and imaging studies have been reviewed    Pertinent findings include:    WBC 11.8  Hgb 7.6  Plt 171    Cr 1.8  CO2 31    I have personally and independently interpretted the following tests:    CXR 10/27 no interval change. Increased density in the right perihilar region and increased interstitial markings in " the lungs bilaterally. ET tube in stable position     Assessment and Plan / Recommendations     Tammie Saunders is a 67 y.o. AA female who has a past medical history of DM2, HTN, ?history of COPD, lung CA undergoing chemotherapy, on 2LNC baseline at home, presenting with acute shortness of breath and PEA arrest, ABG with severe respiratory acidosis, improving on ventilator, however with failed SBT today 2/2 severe anxiety, high airway pressures.    Acute hypoxemic and hypercapnic respiratory failure  Acute decompensated HF with likely preserved EF - pending TTE  S/P PEA arrest   History of possible COPD  History of lung cancer, presently on treatment from [facility]    - Extubate this morning to BiPAP, transition to NC later today  - Pending TTE to evaluate LV function  - Aggressive diuresis today and overnight. Would recommend lasix drip so that nursing can titrate dose in MICU to obtain net negative status. (needs at least 100-150 cc/ hr UOP average)  - Can continue PO steroids for possible COPD/bronchospasm component  - Agree with de-escalating antibiotics given lack of evidence for infection  - Flu vaccine (needs)  - Ensure up to date on pneumonia vaccine series    This plan was discussed with staff pulmonologist Dr. Chakraborty    We will continue to follow with you, thank you for the opportunity to be involved in Ms. Saunders's care.    Please call or message directly through EPIC with any additional questions or concerns.    Ever Kenyon MD  [facility] Fellow  Pager: [phone]  Cell: [phone]    Pt seen and examined with Pulmonary/Critical Care team. Critical Care time was spent validating the history and physical exam, reviewing the lab and imaging results, and discussing the care of the patient with the bedside nurse. The following additional comments are made:    CT yesterday looked like pulmonarye alisha. Had lots of dependent edema. Has not diuresed significantly yet, but mechanics of  breathing and gas exchange good. Extubated to BIPAP. In retrospect this looks most like HFpEF.    Critical Care 30 minutes    Oscar Rocha MD  Phone [phone]

## 2018-10-27 NOTE — PLAN OF CARE
Problem: Patient Care Overview  Goal: Plan of Care Review  Outcome: Ongoing (interventions implemented as appropriate)  Plan of care reviewed with patient and family; pt extubated to bipap and then weaned to 3 l nasocannula- tolerating well. Pt denies pain. Pt sitting up in chair instructed on importance of cough and deep breathing.

## 2018-10-27 NOTE — PLAN OF CARE
Problem: Patient Care Overview  Goal: Plan of Care Review  Outcome: Ongoing (interventions implemented as appropriate)    10/27/2018 6:38 AM   Plan of Care   Plan Of Care Reviewed With Safety: call light in reach, patient oriented to room & instructed how to notify nurse if assistance is needed, current questions/concerns addressed, bed in lowest position with wheels locked & side rails up X 3. Pt and family were educated regarding fall precaution and taking appropriate action. Activity: is bedrest.  Neurological: BONY, follows commands. Nods appropriately.  Respiratory: Ventilator 40%, O2 sat WNL.  Cardiac: BP stable HR stable. Afebrile this shift. Intake/Output: No bm over night. UO dropped for night, MD notified, Lasix ordered and given per order. UO increased since med given. Diet: NPO maintained as ordered Pain: Pt denies pain at this time. Skin: dry and intact. LDA: All lines patent and saline locked. Plan: possible extubation today pending pass SBT.

## 2018-10-27 NOTE — PROGRESS NOTES
Pt extubated by RT per md instructions and placed on Bipap. Plan of care reviewed with patient and pt's family. Will continue to monitor

## 2018-10-27 NOTE — PLAN OF CARE
Problem: Physical Therapy Goal  Goal: Physical Therapy Goal  Goals to be met by: 2018     Patient will increase functional independence with mobility by performin. Supine to sit with [place]  2. Sit to stand transfer with [place]  3. Bed to chair transfer with Modified [place] using Rolling Walker  4. Gait  x 150 feet with Modified [place] using Rolling Walker.     Outcome: Ongoing (interventions implemented as appropriate)  Recommend Out patient cardiopulmonary rehab if unable Home with HH  No DME needs noted

## 2018-10-27 NOTE — PT/OT/SLP EVAL
Physical Therapy Evaluation    Patient Name:  Tammie Saunders   MRN:  [id number]    Recommendations:     Discharge Recommendations:  home with home health, home health PT, home health OT, other (see comments)(cardiopulmonary rehab)   Discharge Equipment Recommendations: none   Barriers to discharge: None    Assessment:     Tammie Saunders is a 67 y.o. female admitted with a medical diagnosis of <principal problem not specified>.  She presents with the following impairments/functional limitations:  weakness, impaired endurance, impaired cardiopulmonary response to activity, impaired functional mobilty, gait instability . Patient tolerated sitting EOB with SBA , able to take steps sideways towards head of bed with SBA .    Rehab Prognosis:  good; patient would benefit from acute skilled PT services to address these deficits and reach maximum level of function.      Recent Surgery: * No surgery found *      Plan:     During this hospitalization, patient to be seen 6 x/week to address the above listed problems via gait training, therapeutic activities, therapeutic exercises  · Plan of Care Expires:  11/27/18   Plan of Care Reviewed with: patient, daughter    Subjective     Communicated with primary nurse prior to session.  Patient found supine HoB raised upon PT entry to room, agreeable to evaluation.      Chief Complaint: weakness  Patient comments/goals: go home  Pain/Comfort:  · Pain Rating 1: 0/10  · Pain Rating Post-Intervention 1: 0/10    Patients cultural, spiritual, Cheondoism conflicts given the current situation:      Living Environment:  Lives with family [facility] with 2 steps to enter  Prior to admission, patients level of function was independent.  Patient has the following equipment: bedside commode, shower chair, walker, rolling.  DME owned (not currently used): rolling walker, bedside commode and shower chair.  Upon discharge, patient will have assistance from family.    Objective:     Patient found with: marry SALOMON  catheter(ICU monitoring )     General Precautions: Standard, fall, respiratory   Orthopedic Precautions:N/A   Braces: N/A     Exams:  · RLE ROM: WFL  · RLE Strength: WFL  · LLE ROM: WFL  · LLE Strength: WFL    Functional Mobility:  · Bed Mobility:     · Supine to Sit: stand by assistance and contact guard assistance  · Sit to Supine: stand by assistance and contact guard assistance  · Transfers:     · Sit to Stand:  stand by assistance and contact guard assistance with hand-held assist  · Gait: steps ~ 4 toward head of bed SBA   · Balance: fair     AM-PAC 6 CLICK MOBILITY  Total Score:19       Therapeutic Activities and Exercises:   na    Patient left HOB elevated with all lines intact, call button in reach and family present.    GOALS:   Multidisciplinary Problems     Physical Therapy Goals        Problem: Physical Therapy Goal    Goal Priority Disciplines Outcome Goal Variances Interventions   Physical Therapy Goal     PT, PT/OT Ongoing (interventions implemented as appropriate)     Description:  Goals to be met by: 2018     Patient will increase functional independence with mobility by performin. Supine to sit with [place]  2. Sit to stand transfer with [place]  3. Bed to chair transfer with Modified [place] using Rolling Walker  4. Gait  x 150 feet with Modified [place] using Rolling Walker.                       History:     History reviewed. No pertinent past medical history.    History reviewed. No pertinent surgical history.    Clinical Decision Making:     History  Co-morbidities and personal factors that may impact the plan of care Examination  Body Structures and Functions, activity limitations and participation restrictions that may impact the plan of care Clinical Presentation   Decision Making/ Complexity Score   Co-morbidities:   [] Time since onset of injury / illness / exacerbation  [] Status of current condition  []Patient's cognitive status and safety concerns     [x] Multiple Medical Problems (see med hx)  Personal Factors:   [] Patient's age  [] Prior Level of function   [] Patient's home situation (environment and family support)  [] Patient's level of motivation  [x] Expected progression of patient      HISTORY:(criteria)    [] 30410 - no personal factors/history    [x] 08613 - has 1-2 personal factor/comorbidity     [] 54084 - has >3 personal factor/comorbidity     Body Regions:  [] Objective examination findings  [] Head     []  Neck  [] Trunk   [] Upper Extremity  [] Lower Extremity    Body Systems:  [] For communication ability, affect, cognition, language, and learning style: the assessment of the ability to make needs known, consciousness, orientation (person, place, and time), expected emotional /behavioral responses, and learning preferences (eg, learning barriers, education  needs)  [] For the neuromuscular system: a general assessment of gross coordinated movement (eg, balance, gait, locomotion, transfers, and transitions) and motor function  (motor control and motor learning)  [] For the musculoskeletal system: the assessment of gross symmetry, gross range of motion, gross strength, height, and weight  [] For the integumentary system: the assessment of pliability(texture), presence of scar formation, skin color, and skin integrity  [x] For cardiovascular/pulmonary system: the assessment of heart rate, respiratory rate, blood pressure, and edema     Activity limitations:    [] Patient's cognitive status and saf ety concerns          [] Status of current condition      [] Weight bearing restriction  [x] Cardiopulmunary Restriction    Participation Restrictions:   [] Goals and goal agreement with the patient     [] Rehab potential (prognosis) and probable outcome      Examination of Body System: (criteria)    [x] 84373 - addressing 1-2 elements    [] 68935 - addressing a total of 3 or more elements     [] 94394 -  Addressing a total of 4 or more elements          Clinical Presentation: (criteria)  Stable - 64984     On examination of body system using standardized tests and measures patient presents with 1-2 elements from any of the following: body structures and functions, activity limitations, and/or participation restrictions.  Leading to a clinical presentation that is considered stable and/or uncomplicated                              Clinical Decision Making  (Eval Complexity):  Low- 29232     Time Tracking:     PT Received On: 10/27/18  PT Start Time: 1101     PT Stop Time: 1120  PT Total Time (min): 19 min     Billable Minutes: Evaluation 19      Amauri Spicer, PT  10/27/2018

## 2018-10-27 NOTE — H&P
"[facility] IM Resident HO-II Progress Note    Subjective:      Patient with decreased urine output overnight. Per night night team, received a couple of hours of LR rate at 75, but concern for volume overload given curly B lines on echo. Received Lasix 40 mg x 1. Patient extubated this morning. She is alert and oriented. Reports that she feels well and denies having any complaints this morning. Unable to recall most of the event that brought her here to the hospital. Reports That she felt extremely short of breath and called EMS. The last thing she recalls is being in the Ambulance. Since with mild wheezing bilaterally on exam. Per pulmonology team, patient will likely benefit from more aggressive diuresis.      Objective:   Last 24 Hour Vital Signs:  BP  Min: 87/51  Max: 155/68  Temp  Av.2 °F (37.3 °C)  Min: 98.7 °F (37.1 °C)  Max: 99.9 °F (37.7 °C)  Pulse  Av.1  Min: 75  Max: 102  Resp  Av.3  Min: 12  Max: 47  SpO2  Av.4 %  Min: 85 %  Max: 100 %  Height  Av' 6" (167.6 cm)  Min: 5' 6" (167.6 cm)  Max: 5' 6" (167.6 cm)  Weight  Av.6 kg (268 lb 1.3 oz)  Min: 121.6 kg (268 lb 1.3 oz)  Max: 121.6 kg (268 lb 1.3 oz)  I/O last 3 completed shifts:  In: 1145.1 [I.V.:436.2; IV Piggyback:708.9]  Out: 1155 [Urine:1155]    Physical Examination:  Gen: Obese. On Bipap, No apparent distress. Speaking in full sentences smiling   HEENT: NC/AT. Conjuctiva clear, sclera anicteric  Pulm: Mild wheezes appreciated bilaterally.   CV: RRR, no S3/S4, M/R/G  Abd: Soft, non-distended, non-tender Unable to appreciate bowel sounds but significant white noise in room.   MSK: No gross deformity. + Mild edema in lower extremiities  Skin: No rashes or lesions.   Neuro: Following commands, alert and oriented. Conversing with staff and family    Laboratory:  Laboratory Data Reviewed:   Pertinent Findings:  Recent Labs     10/25/18  2256 10/25/18  2309 10/26/18  0537 10/27/18  0537   WBC  --  8.23 7.66 11.79   HGB  --  8.6* " 8.0* 7.6*   HCT 30* 29.8* 27.0* 23.9*   PLT  --  182 142* 171   MCV  --  96 91 88   RDW  --  15.2* 15.0* 15.8*   GRAN  --  60.0 87.8*  6.7 89.9*  10.6*   LYMPH  --  28.0  CANCELED 6.4*  0.5* 4.8*  0.6*   MONO  --  8.0  CANCELED 5.5  0.4 5.3  0.6   EOS  --  CANCELED 0.0 0.0   BASO  --  CANCELED 0.00 0.00   EOSINOPHIL  --  1.0 0.0 0.0   BASOPHIL  --  0.0 0.0 0.0       Recent Labs     10/25/18  2309 10/26/18  0537 10/27/18  0537    138 140   K 4.3 3.5 3.8   CL 97 99 100   CO2 26 29 31*   BUN 19 23 33*   CREATININE 1.8* 1.6* 1.8*   * 383* 166*     Recent Labs     10/26/18  0751 10/26/18  1159 10/26/18  1719 10/27/18  0044 10/27/18  0728   POCTGLUCOSE 359* 337* 290* 244* 146*     Recent Labs     10/25/18  2309 10/26/18  0537 10/27/18  0537   PROT 5.8* 5.7* 6.0   ALBUMIN 2.7* 2.5* 2.4*   BILITOT 0.4 0.6 0.5   AST 70* 44* 18   ALT 42 36 25   ALKPHOS 107 87 80         Microbiology Data Reviewed:   Pertinent Findings:      Other Results:      Radiology Data Reviewed:   Pertinent Findings:  Imaging Results          US Lower Extremity Veins Bilateral (Final result)  Result time 10/26/18 02:08:45    Final result by Florentin Beyer MD (10/26/18 02:08:45)                 Impression:      No evidence of deep venous thrombosis in either lower extremity.      Electronically signed by: Florentin Beyer MD  Date:    10/26/2018  Time:    02:08             Narrative:    EXAMINATION:  US LOWER EXTREMITY VEINS BILATERAL    CLINICAL HISTORY:  concern for DVT; Acute respiratory failure with hypoxia    TECHNIQUE:  Duplex and color flow Doppler and dynamic compression was performed of the bilateral lower extremity veins was performed.    COMPARISON:  None    FINDINGS:  Right thigh veins: The common femoral, femoral, popliteal, upper greater saphenous, and deep femoral veins are patent and free of thrombus. The veins are normally compressible and have normal phasic flow and augmentation response.    Right calf veins: The  visualized calf veins are patent.    Left thigh veins: The common femoral, femoral, popliteal, upper greater saphenous, and deep femoral veins are patent and free of thrombus. The veins are normally compressible and have normal phasic flow and augmentation response.    Left calf veins: The visualized calf veins are patent.    Miscellaneous: None                               CT Head Without Contrast (Final result)  Result time 10/26/18 00:37:22    Final result by Sarah Hawley MD (10/26/18 00:37:22)                 Impression:      No acute intracranial abnormalities identified.      Electronically signed by: Sarah Hawley MD  Date:    10/26/2018  Time:    00:37             Narrative:    EXAMINATION:  CT HEAD WITHOUT CONTRAST    CLINICAL HISTORY:  AMS;    TECHNIQUE:  Low dose axial images were obtained through the head.  Coronal and sagittal reformations were also performed. Contrast was not administered.    COMPARISON:  None.    FINDINGS:  There is mild chronic microvascular ischemic change.  No evidence of acute/recent major vascular distribution cerebral infarction, intraparenchymal hemorrhage, or intra-axial space occupying lesion. The ventricular system is normal in size and configuration with no evidence of hydrocephalus. No effacement of the skull-base cisterns. No abnormal extra-axial fluid collections or blood products. The visualized paranasal sinuses and mastoid air cells are clear. The calvarium shows no significant abnormality.  Bilateral exophthalmos is noted.                               CT Chest Without Contrast (Final result)  Result time 10/26/18 00:47:24   Procedure changed from CTA Chest Non-Coronary (PE Study)     Final result by Sarah Hawley MD (10/26/18 00:47:24)                 Impression:      1. Confluent bilateral perihilar consolidative changes with air bronchograms with additional more patchy consolidation seen within the left lower lobe.  Findings may reflect multifocal  pneumonia.  Future follow-up is recommended as potential underlying neoplastic process unable to be excluded.  2. Interlobular septal thickening with additional patchy opacities and scattered ground-glass attenuation seen within the lungs suggestive for superimposed pulmonary edema.  3. Small bilateral pleural effusions with resultant compressive atelectasis.      Electronically signed by: Sarah Hawley MD  Date:    10/26/2018  Time:    00:47             Narrative:    EXAMINATION:  CT CHEST WITHOUT CONTRAST    CLINICAL HISTORY:  hypoxia;    TECHNIQUE:  Low dose axial images, sagittal and coronal reformations were obtained from the thoracic inlet to the lung bases. Contrast was not administered.    COMPARISON:  None.    FINDINGS:  Structures at the base of the neck are unremarkable.  Left-sided PICC line is visualized with distal tip in the SVC.  Aorta is non-aneurysmal.  The heart is normal in size without pericardial effusion.  Aortic and coronary artery atherosclerosis is seen.  Several prominent mediastinal lymph nodes are seen.  Esophagus is unremarkable along its course.    The trachea and bronchi are patent.  Endotracheal tube is visualized within the distal trachea extending just above the liu projecting toward the right mainstem bronchus.  Prominent confluent consolidative changes are seen involving the bilateral perihilar regions with air bronchograms.  Additional more patchy consolidative changes are seen within the left lower lobe.  Small bilateral pleural effusions with additional compressive atelectatic changes are seen within the lower lobes.  There is interlobular septal thickening with scattered ground-glass attenuation and patchy opacities seen throughout the lungs which may reflect pulmonary edema.  Some of these opacities demonstrates somewhat nodular configuration.    The visualized abdominal structures are unremarkable.  Gallbladder has been removed.  Osseous structures demonstrate mild  age-appropriate degenerative change.  Extrathoracic soft tissues are unremarkable.                               X-Ray Chest AP Portable (Final result)  Result time 10/25/18 23:09:40    Final result by Sarah Hawley MD (10/25/18 23:09:40)                 Impression:      See above.      Electronically signed by: Sarah Hawley MD  Date:    10/25/2018  Time:    23:09             Narrative:    EXAMINATION:  XR CHEST AP PORTABLE    CLINICAL HISTORY:  Failed or difficult intubation, initial encounter    TECHNIQUE:  Single frontal view of the chest was performed.    COMPARISON:  None    FINDINGS:  Evaluation limited by large body habitus and relative underpenetrated portable technique.    Left-sided PICC line is visualized with distal tip over the SVC projecting horizontally toward the right.  Endotracheal tube is visualized with distal tip approximately 1.5 cm above the liu.  Support device overlies the left chest wall.    Cardiac silhouette is upper limits of normal in size.  There is prominence of central pulmonary vasculature with bilateral perihilar opacity and increased interstitial attenuation suggestive for pulmonary edema.  Consolidative changes are visualized in the right midlung zone perihilar region.  This could reflect pneumonia or asymmetric edema, with potential perihilar lesion not excluded.  No prior studies are available for comparison.  No evidence of pneumothorax or significant effusion.                                  Current Medications:     Infusions:   norepinephrine bitartrate-D5W Stopped (10/26/18 0717)    propofol Stopped (10/26/18 1223)        Scheduled:   albuterol-ipratropium  3 mL Nebulization Q4H    heparin (porcine)  5,000 Units Subcutaneous Q8H    insulin detemir U-100  20 Units Subcutaneous BID    predniSONE  60 mg Per NG tube Daily    sodium chloride 0.9%  10 mL Intravenous Q6H        PRN:  dextrose 50%, dextrose 50%, glucagon (human recombinant), glucose, glucose,  insulin aspart U-100, labetalol, ramelteon, Flushing PICC Protocol **AND** sodium chloride 0.9% **AND** sodium chloride 0.9%, sodium chloride 0.9%    Antibiotics and Day Number of Therapy:  PO Vanc, IV flagyl    Lines and Day Number of Therapy:      Assessment:     Tammie Saunders is a 67 y.o.female with  Patient Active Problem List    Diagnosis Date Noted    Cardiac arrest 10/26/2018    COPD (chronic obstructive pulmonary disease) 10/26/2018    Acute hypercapnic respiratory failure 10/26/2018    Malignant neoplasm of overlapping sites of right lung 10/26/2018    Other heart failure 10/26/2018    Essential hypertension 10/26/2018    Type 2 diabetes mellitus, with long-term current use of insulin 10/26/2018    PEA (Pulseless electrical activity) 10/26/2018        Plan:     # PEA cardiac arrest  - Received 2 rounds of chest compressions, 1 mg epinephrine before achieving ROSC  - Following commands after ROSC  - Likely due to hypoxemia. Had SPO2 in the 70's in the field prior to the arrest  - Start heparin ggt now.  - TTE, trend trops/EKG, duplex LE US. Holding off getting CTA chest for PE as patient had marked renal insufficiency on presentation (Cr 1.8, GFR 32). Can't get V/Q scan as patient is on vent. No evidence of PE on CTA. Heparin drip discontinued   - BNP elevated at 161. Patient is morbidly obese, so mildly elevated BNP is of little utility.    -Will Attempt for aggressive diuresis today with IV Lasix 80 mg. Strict I/Os    # Acute combined respiratory failure   - EMS reports SPO2 in the 70s in the field  - AB.05 pH, 94 PCO2, 220 O2, BE -4 (post arrest)  -  PE study negative  -Now extubated. On 3L NC  - intrinsic pulmonary disease (COPD and cancer)  - Plan for aggressive diuresis today. Wean O2 as appropriate.     # Lung cancer  - Family reports patient currently receiving chemotherapy at [facility] for lung CA. Has had chemoradiation and immunotherapy in the past.    # DM2, insulin dependent   - On  Tresiba 80 U nightly and Novolog 20 U with lunch and sliding scale at home  - A1c 10.2  - Start levemir 20 U and SSI now. Glucose checks q4h while NPO    # COPD  - On 2 L home oxygen  - Duonebs at home. Continue.    # HTN   - Home meds: Carvedilol 25 mg twice daily. Hold.     Disposition: pending Clinical improvement.     Code Status:   FULL Code    Farhan Velázquez  [facility] Internal Medicine HO-II  U IM Service Team B    [facility] Medicine Hospitalist Pager numbers:   [facility] Hospitalist Medicine Team A (Kirt/Betty): [phone]  [facility] Hospitalist Medicine Team B (Wali/Twila):  [phone]

## 2018-10-27 NOTE — PLAN OF CARE
Problem: Occupational Therapy Goal  Goal: Occupational Therapy Goal  Goals to be met by: 11/27     Patient will increase functional independence with ADLs by performing:    UE Dressing with Modified [place].  LE Dressing with Set-up Assistance.  Grooming while standing with Modified [place].  Toileting from toilet with Modified [place] for hygiene and clothing management.   Toilet transfer to toilet with Modified [place].  Upper extremity exercise program x10 reps per handout, with independence.    Outcome: Ongoing (interventions implemented as appropriate)  OT eval performed, report to follow    Pt may benefit from OP pulmonary/cardiac rehab.  If unable, then home w/HH OT/PT.  Has DME

## 2018-10-27 NOTE — PROGRESS NOTES
Patient extubated to Bipap with documented settings per MD order.  Patient tolerating well.  Will try to wean today.  Will continue to monitor.

## 2018-10-27 NOTE — PLAN OF CARE
Problem: Patient Care Overview  Goal: Plan of Care Review  Outcome: Ongoing (interventions implemented as appropriate)  Patient on  with documented settings.  Alarms are set and functioning with adequate volumes.  AMBU bag and mask at bedside. The proper method of use, as well as anticipated side effects, of this aerosol treatment are discussed and demonstrated to the patient. Will continue to monitor.

## 2018-10-27 NOTE — PLAN OF CARE
Problem: Patient Care Overview  Goal: Plan of Care Review  Outcome: Ongoing (interventions implemented as appropriate)  Patient on  with documented settings.  Alarms are set and functioning with adequate volumes.  AMBU bag and mask at bedside.  SBT initiated.  Will continue to monitor.

## 2018-10-27 NOTE — PLAN OF CARE
On call provider called as patient with decreasing urine output overnight. Patient UO was 15 ml/hr for over 4 hours (patient goal of 60 ml/hr). Initially started on LR at 75 ml/hr, patient received for about 2 hours. However, after visit to room and chart review from previous critical care consult who found B lines on bedside US we will start diuresing patient. Will start with dose of 40 mg IV lasix at this time. Will update day team.    Truman Mc DO  [facility] Internal Medicine HO-1  10/27/2018

## 2018-10-27 NOTE — NURSING
Spoke with physician again regarding pt decreasing UO to 15ml/hr x4 hours. Orders being put in by MD. Will continue to monitor and will implement orders.

## 2018-10-28 LAB
ALBUMIN SERPL BCP-MCNC: 2.4 G/DL
ALP SERPL-CCNC: 81 U/L
ALT SERPL W/O P-5'-P-CCNC: 22 U/L
ANION GAP SERPL CALC-SCNC: 10 MMOL/L
AST SERPL-CCNC: 15 U/L
BACTERIA SPEC AEROBE CULT: NORMAL
BASOPHILS # BLD AUTO: 0.01 K/UL
BASOPHILS NFR BLD: 0.1 %
BILIRUB SERPL-MCNC: 0.4 MG/DL
BUN SERPL-MCNC: 38 MG/DL
CALCIUM SERPL-MCNC: 9 MG/DL
CHLORIDE SERPL-SCNC: 97 MMOL/L
CO2 SERPL-SCNC: 31 MMOL/L
CREAT SERPL-MCNC: 1.7 MG/DL
DIFFERENTIAL METHOD: ABNORMAL
EOSINOPHIL # BLD AUTO: 0 K/UL
EOSINOPHIL NFR BLD: 0 %
ERYTHROCYTE [DISTWIDTH] IN BLOOD BY AUTOMATED COUNT: 15.9 %
EST. GFR  (AFRICAN AMERICAN): 35 ML/MIN/1.73 M^2
EST. GFR  (NON AFRICAN AMERICAN): 30 ML/MIN/1.73 M^2
GLUCOSE SERPL-MCNC: 185 MG/DL
GRAM STN SPEC: NORMAL
HCT VFR BLD AUTO: 24.3 %
HGB BLD-MCNC: 7.4 G/DL
LYMPHOCYTES # BLD AUTO: 0.6 K/UL
LYMPHOCYTES NFR BLD: 4.9 %
MAGNESIUM SERPL-MCNC: 2.1 MG/DL
MCH RBC QN AUTO: 27.6 PG
MCHC RBC AUTO-ENTMCNC: 30.5 G/DL
MCV RBC AUTO: 91 FL
MONOCYTES # BLD AUTO: 1.1 K/UL
MONOCYTES NFR BLD: 8.7 %
NEUTROPHILS # BLD AUTO: 10.4 K/UL
NEUTROPHILS NFR BLD: 86.1 %
PHOSPHATE SERPL-MCNC: 4.3 MG/DL
PLATELET # BLD AUTO: 209 K/UL
PMV BLD AUTO: 9.3 FL
POCT GLUCOSE: 153 MG/DL (ref 70–110)
POCT GLUCOSE: 153 MG/DL (ref 70–110)
POCT GLUCOSE: 207 MG/DL (ref 70–110)
POCT GLUCOSE: 223 MG/DL (ref 70–110)
POCT GLUCOSE: 373 MG/DL (ref 70–110)
POCT GLUCOSE: 402 MG/DL (ref 70–110)
POTASSIUM SERPL-SCNC: 3.9 MMOL/L
PROT SERPL-MCNC: 6.4 G/DL
RBC # BLD AUTO: 2.68 M/UL
SODIUM SERPL-SCNC: 138 MMOL/L
WBC # BLD AUTO: 12.14 K/UL

## 2018-10-28 PROCEDURE — 25000003 PHARM REV CODE 250: Performed by: STUDENT IN AN ORGANIZED HEALTH CARE EDUCATION/TRAINING PROGRAM

## 2018-10-28 PROCEDURE — 25000242 PHARM REV CODE 250 ALT 637 W/ HCPCS: Performed by: STUDENT IN AN ORGANIZED HEALTH CARE EDUCATION/TRAINING PROGRAM

## 2018-10-28 PROCEDURE — 25000003 PHARM REV CODE 250: Performed by: INTERNAL MEDICINE

## 2018-10-28 PROCEDURE — 84100 ASSAY OF PHOSPHORUS: CPT

## 2018-10-28 PROCEDURE — 80053 COMPREHEN METABOLIC PANEL: CPT

## 2018-10-28 PROCEDURE — 94761 N-INVAS EAR/PLS OXIMETRY MLT: CPT

## 2018-10-28 PROCEDURE — 63600175 PHARM REV CODE 636 W HCPCS: Performed by: STUDENT IN AN ORGANIZED HEALTH CARE EDUCATION/TRAINING PROGRAM

## 2018-10-28 PROCEDURE — 85025 COMPLETE CBC W/AUTO DIFF WBC: CPT

## 2018-10-28 PROCEDURE — A4216 STERILE WATER/SALINE, 10 ML: HCPCS | Performed by: INTERNAL MEDICINE

## 2018-10-28 PROCEDURE — 27000221 HC OXYGEN, UP TO 24 HOURS

## 2018-10-28 PROCEDURE — 83735 ASSAY OF MAGNESIUM: CPT

## 2018-10-28 PROCEDURE — 63600175 PHARM REV CODE 636 W HCPCS: Performed by: HOSPITALIST

## 2018-10-28 PROCEDURE — 94640 AIRWAY INHALATION TREATMENT: CPT

## 2018-10-28 PROCEDURE — 11000001 HC ACUTE MED/SURG PRIVATE ROOM

## 2018-10-28 RX ORDER — SENNOSIDES 8.6 MG/1
8.6 TABLET ORAL DAILY PRN
Status: DISCONTINUED | OUTPATIENT
Start: 2018-10-28 | End: 2018-10-29 | Stop reason: HOSPADM

## 2018-10-28 RX ORDER — POLYETHYLENE GLYCOL 3350 17 G/17G
17 POWDER, FOR SOLUTION ORAL DAILY
Status: DISCONTINUED | OUTPATIENT
Start: 2018-10-28 | End: 2018-10-29 | Stop reason: HOSPADM

## 2018-10-28 RX ORDER — FUROSEMIDE 10 MG/ML
80 INJECTION INTRAMUSCULAR; INTRAVENOUS 3 TIMES DAILY
Status: DISCONTINUED | OUTPATIENT
Start: 2018-10-28 | End: 2018-10-29 | Stop reason: HOSPADM

## 2018-10-28 RX ORDER — SIMETHICONE 125 MG
125 TABLET,CHEWABLE ORAL EVERY 6 HOURS PRN
Status: DISCONTINUED | OUTPATIENT
Start: 2018-10-28 | End: 2018-10-29 | Stop reason: HOSPADM

## 2018-10-28 RX ADMIN — Medication 10 ML: at 12:10

## 2018-10-28 RX ADMIN — Medication 10 ML: at 11:10

## 2018-10-28 RX ADMIN — INSULIN ASPART 5 UNITS: 100 INJECTION, SOLUTION INTRAVENOUS; SUBCUTANEOUS at 08:10

## 2018-10-28 RX ADMIN — HEPARIN SODIUM 5000 UNITS: 5000 INJECTION, SOLUTION INTRAVENOUS; SUBCUTANEOUS at 09:10

## 2018-10-28 RX ADMIN — HEPARIN SODIUM 5000 UNITS: 5000 INJECTION, SOLUTION INTRAVENOUS; SUBCUTANEOUS at 06:10

## 2018-10-28 RX ADMIN — IPRATROPIUM BROMIDE AND ALBUTEROL SULFATE 3 ML: .5; 3 SOLUTION RESPIRATORY (INHALATION) at 07:10

## 2018-10-28 RX ADMIN — INSULIN ASPART 10 UNITS: 100 INJECTION, SOLUTION INTRAVENOUS; SUBCUTANEOUS at 05:10

## 2018-10-28 RX ADMIN — INSULIN ASPART 2 UNITS: 100 INJECTION, SOLUTION INTRAVENOUS; SUBCUTANEOUS at 01:10

## 2018-10-28 RX ADMIN — INSULIN DETEMIR 20 UNITS: 100 INJECTION, SOLUTION SUBCUTANEOUS at 09:10

## 2018-10-28 RX ADMIN — PREDNISONE 60 MG: 20 TABLET ORAL at 08:10

## 2018-10-28 RX ADMIN — FUROSEMIDE 80 MG: 10 INJECTION, SOLUTION INTRAMUSCULAR; INTRAVENOUS at 09:10

## 2018-10-28 RX ADMIN — INSULIN DETEMIR 20 UNITS: 100 INJECTION, SOLUTION SUBCUTANEOUS at 08:10

## 2018-10-28 RX ADMIN — HEPARIN SODIUM 5000 UNITS: 5000 INJECTION, SOLUTION INTRAVENOUS; SUBCUTANEOUS at 03:10

## 2018-10-28 RX ADMIN — IPRATROPIUM BROMIDE AND ALBUTEROL SULFATE 3 ML: .5; 3 SOLUTION RESPIRATORY (INHALATION) at 03:10

## 2018-10-28 RX ADMIN — IPRATROPIUM BROMIDE AND ALBUTEROL SULFATE 3 ML: .5; 3 SOLUTION RESPIRATORY (INHALATION) at 08:10

## 2018-10-28 RX ADMIN — LABETALOL HYDROCHLORIDE 10 MG: 5 INJECTION, SOLUTION INTRAVENOUS at 12:10

## 2018-10-28 RX ADMIN — FUROSEMIDE 80 MG: 10 INJECTION, SOLUTION INTRAMUSCULAR; INTRAVENOUS at 08:10

## 2018-10-28 RX ADMIN — IPRATROPIUM BROMIDE AND ALBUTEROL SULFATE 3 ML: .5; 3 SOLUTION RESPIRATORY (INHALATION) at 11:10

## 2018-10-28 RX ADMIN — Medication 10 ML: at 06:10

## 2018-10-28 RX ADMIN — FUROSEMIDE 80 MG: 10 INJECTION, SOLUTION INTRAMUSCULAR; INTRAVENOUS at 03:10

## 2018-10-28 RX ADMIN — INSULIN ASPART 2 UNITS: 100 INJECTION, SOLUTION INTRAVENOUS; SUBCUTANEOUS at 08:10

## 2018-10-28 RX ADMIN — INSULIN ASPART 2 UNITS: 100 INJECTION, SOLUTION INTRAVENOUS; SUBCUTANEOUS at 12:10

## 2018-10-28 RX ADMIN — IPRATROPIUM BROMIDE AND ALBUTEROL SULFATE 3 ML: .5; 3 SOLUTION RESPIRATORY (INHALATION) at 12:10

## 2018-10-28 RX ADMIN — SENNOSIDES 8.6 MG: 8.6 TABLET, FILM COATED ORAL at 10:10

## 2018-10-28 RX ADMIN — POLYETHYLENE GLYCOL 3350 17 G: 17 POWDER, FOR SOLUTION ORAL at 11:10

## 2018-10-28 NOTE — PROGRESS NOTES
Patient arrived to floor with elevated BP, [facility] team notified of BP and that PRN Labetalol will be given, team stated OK.  Will continue to monitor.

## 2018-10-28 NOTE — NURSING
Patient received to 508 from ICU.  Introduced myself and explained the role of the virtual nurse.  Patient verbalized understanding.  Patient denies any concerns, questions or needs a this time.  Will continue to monitor.

## 2018-10-28 NOTE — PLAN OF CARE
Problem: Patient Care Overview  Goal: Plan of Care Review  Outcome: Ongoing (interventions implemented as appropriate)  Pt AAOx4 and denies all c/o pain. NAD noted. Frequent rounds made to assess for safety and discomfort, fall precautions maintained. Call bell within reach. Will continue to monitor

## 2018-10-28 NOTE — PROGRESS NOTES
Pt transported via wheelchair by RN from 264 to 508. Pt assisted into new bed and oriented to room. Call light placed in reach. Family at bedside. Chart, medicines and pt belongings transported with pt. LEATHA Carroll at bedside.

## 2018-10-28 NOTE — PROGRESS NOTES
[facility] IM Resident HO-II Progress Note    Subjective:      No acute events overnight per nursing. Patient sitting up on the side of the bed this morning. Reports that she feels well. Her breathing has improved. Now on home O2 level of 2L NC. Denies having any other issues this morning. Diuresed ~2L over last 24 hours. Net negative 800 ccs. Patient aware of plan for continued aggressive diuresis today. Likely step down to floor.        Objective:   Last 24 Hour Vital Signs:  BP  Min: 120/57  Max: 189/79  Temp  Av.1 °F (36.7 °C)  Min: 97.7 °F (36.5 °C)  Max: 98.7 °F (37.1 °C)  Pulse  Av.2  Min: 77  Max: 101  Resp  Av.6  Min: 13  Max: 46  SpO2  Av %  Min: 89 %  Max: 100 %  Weight  Av.3 kg (263 lb 0.1 oz)  Min: 119.3 kg (263 lb 0.1 oz)  Max: 119.3 kg (263 lb 0.1 oz)  I/O last 3 completed shifts:  In: 1200 [P.O.:1200]  Out: 2520 [Urine:2520]    Physical Examination:  Gen: Obese. On 2L NC, No apparent distress. Speaking in full sentences smiling   HEENT: NC/AT. Conjuctiva clear, sclera anicteric  Pulm: Wheezes improved from previous exam. No cracklles, rhonchi, or rubs appreciated.   CV: RRR, no S3/S4, M/R/G  Abd: Soft, non-distended, non-tender Unable to appreciate bowel sounds but significant white noise in room.   MSK: No gross deformity. + Mild edema in lower extremiities  Skin: No rashes or lesions.   Neuro: Following commands, alert and oriented. Conversing with family    Laboratory:  Laboratory Data Reviewed:   Pertinent Findings:  Recent Labs     10/25/18  2256  10/25/18  2309 10/26/18  0537 10/27/18  0537 10/28/18  0551   WBC  --   --  8.23 7.66 11.79 12.14   HGB  --   --  8.6* 8.0* 7.6* 7.4*   HCT 30*  --  29.8* 27.0* 23.9* 24.3*   PLT  --   --  182 142* 171 209   MCV  --   --  96 91 88 91   RDW  --   --  15.2* 15.0* 15.8* 15.9*   GRAN  --    < > 60.0 87.8*  6.7 89.9*  10.6* 86.1*  10.4*   LYMPH  --    < > 28.0  CANCELED 6.4*  0.5* 4.8*  0.6* 4.9*  0.6*   MONO  --    < > 8.0   CANCELED 5.5  0.4 5.3  0.6 8.7  1.1*   EOS  --   --  CANCELED 0.0 0.0 0.0   BASO  --   --  CANCELED 0.00 0.00 0.01   EOSINOPHIL  --   --  1.0 0.0 0.0 0.0   BASOPHIL  --   --  0.0 0.0 0.0 0.1    < > = values in this interval not displayed.       Recent Labs     10/25/18  2309 10/26/18  0537 10/27/18  0537 10/28/18  0551    138 140 138   K 4.3 3.5 3.8 3.9   CL 97 99 100 97   CO2 26 29 31* 31*   BUN 19 23 33* 38*   CREATININE 1.8* 1.6* 1.8* 1.7*   * 383* 166* 185*     Recent Labs     10/27/18  2128 10/27/18  2129 10/28/18  0104 10/28/18  0359 10/28/18  0735   POCTGLUCOSE 285* 291* 223* 207* 153*     Recent Labs     10/25/18  2309 10/26/18  0537 10/27/18  0537 10/28/18  0551   PROT 5.8* 5.7* 6.0 6.4   ALBUMIN 2.7* 2.5* 2.4* 2.4*   BILITOT 0.4 0.6 0.5 0.4   AST 70* 44* 18 15   ALT 42 36 25 22   ALKPHOS 107 87 80 81         Microbiology Data Reviewed:   Pertinent Findings:      Other Results:      Radiology Data Reviewed:   Pertinent Findings:  Imaging Results          US Lower Extremity Veins Bilateral (Final result)  Result time 10/26/18 02:08:45    Final result by Florentin Beyer MD (10/26/18 02:08:45)                 Impression:      No evidence of deep venous thrombosis in either lower extremity.      Electronically signed by: Florentin Beyer MD  Date:    10/26/2018  Time:    02:08             Narrative:    EXAMINATION:  US LOWER EXTREMITY VEINS BILATERAL    CLINICAL HISTORY:  concern for DVT; Acute respiratory failure with hypoxia    TECHNIQUE:  Duplex and color flow Doppler and dynamic compression was performed of the bilateral lower extremity veins was performed.    COMPARISON:  None    FINDINGS:  Right thigh veins: The common femoral, femoral, popliteal, upper greater saphenous, and deep femoral veins are patent and free of thrombus. The veins are normally compressible and have normal phasic flow and augmentation response.    Right calf veins: The visualized calf veins are patent.    Left thigh  veins: The common femoral, femoral, popliteal, upper greater saphenous, and deep femoral veins are patent and free of thrombus. The veins are normally compressible and have normal phasic flow and augmentation response.    Left calf veins: The visualized calf veins are patent.    Miscellaneous: None                               CT Head Without Contrast (Final result)  Result time 10/26/18 00:37:22    Final result by Sarah Hawley MD (10/26/18 00:37:22)                 Impression:      No acute intracranial abnormalities identified.      Electronically signed by: Sarah Hawley MD  Date:    10/26/2018  Time:    00:37             Narrative:    EXAMINATION:  CT HEAD WITHOUT CONTRAST    CLINICAL HISTORY:  AMS;    TECHNIQUE:  Low dose axial images were obtained through the head.  Coronal and sagittal reformations were also performed. Contrast was not administered.    COMPARISON:  None.    FINDINGS:  There is mild chronic microvascular ischemic change.  No evidence of acute/recent major vascular distribution cerebral infarction, intraparenchymal hemorrhage, or intra-axial space occupying lesion. The ventricular system is normal in size and configuration with no evidence of hydrocephalus. No effacement of the skull-base cisterns. No abnormal extra-axial fluid collections or blood products. The visualized paranasal sinuses and mastoid air cells are clear. The calvarium shows no significant abnormality.  Bilateral exophthalmos is noted.                               CT Chest Without Contrast (Final result)  Result time 10/26/18 00:47:24   Procedure changed from CTA Chest Non-Coronary (PE Study)     Final result by Sarah Hawley MD (10/26/18 00:47:24)                 Impression:      1. Confluent bilateral perihilar consolidative changes with air bronchograms with additional more patchy consolidation seen within the left lower lobe.  Findings may reflect multifocal pneumonia.  Future follow-up is recommended as  potential underlying neoplastic process unable to be excluded.  2. Interlobular septal thickening with additional patchy opacities and scattered ground-glass attenuation seen within the lungs suggestive for superimposed pulmonary edema.  3. Small bilateral pleural effusions with resultant compressive atelectasis.      Electronically signed by: Sarah Hawley MD  Date:    10/26/2018  Time:    00:47             Narrative:    EXAMINATION:  CT CHEST WITHOUT CONTRAST    CLINICAL HISTORY:  hypoxia;    TECHNIQUE:  Low dose axial images, sagittal and coronal reformations were obtained from the thoracic inlet to the lung bases. Contrast was not administered.    COMPARISON:  None.    FINDINGS:  Structures at the base of the neck are unremarkable.  Left-sided PICC line is visualized with distal tip in the SVC.  Aorta is non-aneurysmal.  The heart is normal in size without pericardial effusion.  Aortic and coronary artery atherosclerosis is seen.  Several prominent mediastinal lymph nodes are seen.  Esophagus is unremarkable along its course.    The trachea and bronchi are patent.  Endotracheal tube is visualized within the distal trachea extending just above the liu projecting toward the right mainstem bronchus.  Prominent confluent consolidative changes are seen involving the bilateral perihilar regions with air bronchograms.  Additional more patchy consolidative changes are seen within the left lower lobe.  Small bilateral pleural effusions with additional compressive atelectatic changes are seen within the lower lobes.  There is interlobular septal thickening with scattered ground-glass attenuation and patchy opacities seen throughout the lungs which may reflect pulmonary edema.  Some of these opacities demonstrates somewhat nodular configuration.    The visualized abdominal structures are unremarkable.  Gallbladder has been removed.  Osseous structures demonstrate mild age-appropriate degenerative change.  Extrathoracic  soft tissues are unremarkable.                               X-Ray Chest AP Portable (Final result)  Result time 10/25/18 23:09:40    Final result by Sarah Hawley MD (10/25/18 23:09:40)                 Impression:      See above.      Electronically signed by: Sarah Hawley MD  Date:    10/25/2018  Time:    23:09             Narrative:    EXAMINATION:  XR CHEST AP PORTABLE    CLINICAL HISTORY:  Failed or difficult intubation, initial encounter    TECHNIQUE:  Single frontal view of the chest was performed.    COMPARISON:  None    FINDINGS:  Evaluation limited by large body habitus and relative underpenetrated portable technique.    Left-sided PICC line is visualized with distal tip over the SVC projecting horizontally toward the right.  Endotracheal tube is visualized with distal tip approximately 1.5 cm above the liu.  Support device overlies the left chest wall.    Cardiac silhouette is upper limits of normal in size.  There is prominence of central pulmonary vasculature with bilateral perihilar opacity and increased interstitial attenuation suggestive for pulmonary edema.  Consolidative changes are visualized in the right midlung zone perihilar region.  This could reflect pneumonia or asymmetric edema, with potential perihilar lesion not excluded.  No prior studies are available for comparison.  No evidence of pneumothorax or significant effusion.                                  Current Medications:     Infusions:       Scheduled:   albuterol-ipratropium  3 mL Nebulization Q4H    furosemide  80 mg Intravenous TID    heparin (porcine)  5,000 Units Subcutaneous Q8H    insulin detemir U-100  20 Units Subcutaneous BID    predniSONE  60 mg Per NG tube Daily    sodium chloride 0.9%  10 mL Intravenous Q6H        PRN:  dextrose 50%, dextrose 50%, glucagon (human recombinant), glucose, glucose, influenza, insulin aspart U-100, labetalol, ramelteon, [place] PICC Protocol **AND** sodium chloride 0.9% **AND**  sodium chloride 0.9%, sodium chloride 0.9%    Antibiotics and Day Number of Therapy:  PO Vanc, IV flagyl    Lines and Day Number of Therapy:      Assessment:     Tammie Saunders is a 67 y.o.female with  Patient Active Problem List    Diagnosis Date Noted    Cardiac arrest 10/26/2018    COPD (chronic obstructive pulmonary disease) 10/26/2018    Acute hypercapnic respiratory failure 10/26/2018    Malignant neoplasm of overlapping sites of right lung 10/26/2018    Other heart failure 10/26/2018    Essential hypertension 10/26/2018    Type 2 diabetes mellitus, with long-term current use of insulin 10/26/2018    PEA (Pulseless electrical activity) 10/26/2018        Plan:     # PEA cardiac arrest  - Received 2 rounds of chest compressions, 1 mg epinephrine before achieving ROSC  - Following commands after ROSC  - Likely due to hypoxemia. Had SPO2 in the 70's in the field prior to the arrest  - Start heparin ggt now.  - TTE, trend trops/EKG, duplex LE US. Holding off getting CTA chest for PE as patient had marked renal insufficiency on presentation (Cr 1.8, GFR 32). Can't get V/Q scan as patient is on vent. No evidence of PE on CTA. Heparin drip discontinued   - BNP elevated at 161. Patient is morbidly obese, so mildly elevated BNP is of little utility.    -Continue aggressive diuresis today with IV Lasix 80 mg TID. Strict I/Os.  -Step down to floor today.     # Acute combined respiratory failure   - EMS reports SPO2 in the 70s in the field  - AB.05 pH, 94 PCO2, 220 O2, BE -4 (post arrest)  -  PE study negative  -Now extubated. On 3L NC  - intrinsic pulmonary disease (COPD and cancer)  - Plan for aggressive diuresis today. Wean O2 as appropriate.     # Lung cancer  - Family reports patient currently receiving chemotherapy at [facility] for lung CA. Has had chemoradiation and immunotherapy in the past.    # DM2, insulin dependent   - On Tresiba 80 U nightly and Novolog 20 U with lunch and sliding scale at home  -  A1c 10.2  - Start levemir 20 U and SSI now. Glucose checks q4h while NPO    # COPD  - On 2 L home oxygen  - Duonebs at home. Continue.    # HTN   - Home meds: Carvedilol 25 mg twice daily. Hold.     Disposition: Stepped down to floor. Pending clinical improvement.     Code Status:   FULL Code    Farhan Velázquez  [facility] Internal Medicine HO-II  [facility] IM Service Team B    [facility] Medicine Hospitalist Pager numbers:   [facility] Hospitalist Medicine Team A (Kirt/Betty): [phone]  [facility] Hospitalist Medicine Team B (Wali/Twila):  [phone]

## 2018-10-28 NOTE — PLAN OF CARE
Problem: Patient Care Overview  Goal: Plan of Care Review  Outcome: Ongoing (interventions implemented as appropriate)  Patient on oxygen with documented flow.  Will attempt to wean per O2 order protocol. The proper method of use, as well as anticipated side effects, of this aerosol treatment are discussed and demonstrated to the patient. Family at the bedside. Ambu bag and mask at the bedside. Will continue to monitor.

## 2018-10-28 NOTE — NURSING
Afternoon round completed. Patient denies any questions, concerns or needs at this time.  Will continue to monitor.

## 2018-10-28 NOTE — PROGRESS NOTES
"[facility] Pul[facility] Medicine  Consult Follow Up    Primary Attending:  Fabricio Keyes MD   Consultant Attending: Morales Miranda MD   Consultant Fellow: Ever Kenyon MD     Chief Complaint/Reason for Consult      Respiratroy failure, PEA cardiac arrest     Interval History      Did well overnight. Awake and sitting on side of bed this morning talking on the phone. Wheezing subjectively improved. No fevers or chills. Had great UOP overnight with lasix. Awaiting step down from ICU today. Still has some VALENTE/SOB but she feels its improved.     Updated Review of Systems      Review of Systems   Constitutional: Negative for chills, fever and malaise/fatigue.   Respiratory: Positive for shortness of breath. Negative for cough, hemoptysis, sputum production and wheezing.    Gastrointestinal: Negative for abdominal pain, nausea and vomiting.        Medications and nursing orders have been reviewed    On Examination     BP (!) 171/76 (Patient Position: Sitting)   Pulse 84   Temp 98.1 °F (36.7 °C) (Oral)   Resp 20   Ht 5' 6" (1.676 m)   Wt 119.3 kg (263 lb 0.1 oz)   SpO2 (!) 92%   Breastfeeding? No   BMI 42.45 kg/m²     Physical Exam   Constitutional: She is oriented to person, place, and time. She appears well-developed and well-nourished. No distress.   HENT:   Head: Normocephalic and atraumatic.   Neck: Neck supple.   Cardiovascular: Normal rate, regular rhythm, normal heart sounds and intact distal pulses. Exam reveals no friction rub.   No murmur heard.  Pulmonary/Chest: Effort normal. She has wheezes.   Abdominal: Soft. Bowel sounds are normal. She exhibits no distension. There is no tenderness.   Musculoskeletal: She exhibits edema.   Neurological: She is alert and oriented to person, place, and time.   Skin: Skin is warm and dry. Capillary refill takes less than 2 seconds. No rash noted. No erythema.   Psychiatric: Her behavior is normal.   Vitals reviewed.      All recent labs and imaging studies have been " reviewed    Pertinent findings include:    BMP  Lab Results   Component Value Date     10/28/2018    K 3.9 10/28/2018    CL 97 10/28/2018    CO2 31 (H) 10/28/2018    BUN 38 (H) 10/28/2018    CREATININE 1.7 (H) 10/28/2018    CALCIUM 9.0 10/28/2018    ANIONGAP 10 10/28/2018    ESTGFRAFRICA 35 (A) 10/28/2018    EGFRNONAA 30 (A) 10/28/2018         I have personally and independently interpretted the following tests:    No new imaging    Assessment and Plan / Recommendations     Tammie Saunders is a 67 y.o. AA female who has a past medical history of DM2, HTN, ?history of COPD, lung CA undergoing chemotherapy, on 2LNC baseline at home, presenting with acute shortness of breath and PEA arrest, ABG with severe respiratory acidosis, improving on ventilator, however with failed SBT today 2/2 severe anxiety, high airway pressures.    Acute hypoxemic and hypercapnic respiratory failure  Acute decompensated HF with depressed EF   S/P PEA arrest   History of possible COPD  History of lung cancer, presently on treatment from [facility]    - Extubated, doing well on NC, wheezes are improving  - TTE with combined systolic and diastolic dysfunction  - Continue aggressive diuresis (needs at least 100-150 cc/ hr UOP average)  - Can continue PO steroids for possible COPD/bronchospasm component  - Flu vaccine (needs)  - Ensure up to date on pneumonia vaccine series    This plan was discussed with staff pulmonologist Dr. Chakraborty    We will continue to follow with you, thank you for the opportunity to be involved in Ms. Saunders's care.    Please call or message directly through EPIC with any additional questions or concerns.    Ever Kenyon MD  [facility] Fellow  Pager: [phone]  Cell: [phone]    Pt seen and examined with Pulmonary/Critical Care team and this note reviewed and validated with the following additional comments:    Continues to improve with ICS and systemic steroids. Sees Kyle Pan for  her lungs.  Says that her PFTs are restrictive and not obstructive. Needs repeat PFTs while she is wheezing.    Oscar Rocha MD  Phone [phone]

## 2018-10-29 VITALS
WEIGHT: 263 LBS | HEART RATE: 76 BPM | HEIGHT: 66 IN | OXYGEN SATURATION: 94 % | TEMPERATURE: 98 F | DIASTOLIC BLOOD PRESSURE: 68 MMHG | SYSTOLIC BLOOD PRESSURE: 144 MMHG | BODY MASS INDEX: 42.27 KG/M2 | RESPIRATION RATE: 20 BRPM

## 2018-10-29 PROBLEM — I46.9 CARDIAC ARREST: Status: RESOLVED | Noted: 2018-10-26 | Resolved: 2018-10-29

## 2018-10-29 PROBLEM — J96.02 ACUTE HYPERCAPNIC RESPIRATORY FAILURE: Status: RESOLVED | Noted: 2018-10-26 | Resolved: 2018-10-29

## 2018-10-29 PROBLEM — I46.9 PEA (PULSELESS ELECTRICAL ACTIVITY): Status: RESOLVED | Noted: 2018-10-26 | Resolved: 2018-10-29

## 2018-10-29 LAB
ALBUMIN SERPL BCP-MCNC: 2.5 G/DL
ALP SERPL-CCNC: 78 U/L
ALT SERPL W/O P-5'-P-CCNC: 20 U/L
ANION GAP SERPL CALC-SCNC: 11 MMOL/L
APTT BLDCRRT: 28.6 SEC
AST SERPL-CCNC: 13 U/L
BASOPHILS # BLD AUTO: 0 K/UL
BASOPHILS NFR BLD: 0 %
BILIRUB SERPL-MCNC: 0.4 MG/DL
BUN SERPL-MCNC: 35 MG/DL
CALCIUM SERPL-MCNC: 9.2 MG/DL
CHLORIDE SERPL-SCNC: 97 MMOL/L
CO2 SERPL-SCNC: 33 MMOL/L
CREAT SERPL-MCNC: 1.5 MG/DL
DIFFERENTIAL METHOD: ABNORMAL
EOSINOPHIL # BLD AUTO: 0 K/UL
EOSINOPHIL NFR BLD: 0 %
ERYTHROCYTE [DISTWIDTH] IN BLOOD BY AUTOMATED COUNT: 15.4 %
EST. GFR  (AFRICAN AMERICAN): 41 ML/MIN/1.73 M^2
EST. GFR  (NON AFRICAN AMERICAN): 35 ML/MIN/1.73 M^2
GLUCOSE SERPL-MCNC: 158 MG/DL
HCT VFR BLD AUTO: 25.7 %
HGB BLD-MCNC: 7.8 G/DL
LYMPHOCYTES # BLD AUTO: 0.7 K/UL
LYMPHOCYTES NFR BLD: 6.4 %
MAGNESIUM SERPL-MCNC: 2 MG/DL
MCH RBC QN AUTO: 27.5 PG
MCHC RBC AUTO-ENTMCNC: 30.4 G/DL
MCV RBC AUTO: 91 FL
MONOCYTES # BLD AUTO: 1.1 K/UL
MONOCYTES NFR BLD: 10.6 %
NEUTROPHILS # BLD AUTO: 8.9 K/UL
NEUTROPHILS NFR BLD: 82.7 %
PHOSPHATE SERPL-MCNC: 4.2 MG/DL
PLATELET # BLD AUTO: 274 K/UL
PMV BLD AUTO: 9.5 FL
POCT GLUCOSE: 119 MG/DL (ref 70–110)
POCT GLUCOSE: 207 MG/DL (ref 70–110)
POCT GLUCOSE: 223 MG/DL (ref 70–110)
POCT GLUCOSE: 80 MG/DL (ref 70–110)
POTASSIUM SERPL-SCNC: 3.6 MMOL/L
PROT SERPL-MCNC: 6.6 G/DL
RBC # BLD AUTO: 2.84 M/UL
SODIUM SERPL-SCNC: 141 MMOL/L
WBC # BLD AUTO: 10.71 K/UL

## 2018-10-29 PROCEDURE — 97530 THERAPEUTIC ACTIVITIES: CPT

## 2018-10-29 PROCEDURE — 27000221 HC OXYGEN, UP TO 24 HOURS

## 2018-10-29 PROCEDURE — 85025 COMPLETE CBC W/AUTO DIFF WBC: CPT

## 2018-10-29 PROCEDURE — A4216 STERILE WATER/SALINE, 10 ML: HCPCS | Performed by: INTERNAL MEDICINE

## 2018-10-29 PROCEDURE — 80053 COMPREHEN METABOLIC PANEL: CPT

## 2018-10-29 PROCEDURE — 94640 AIRWAY INHALATION TREATMENT: CPT

## 2018-10-29 PROCEDURE — 25000242 PHARM REV CODE 250 ALT 637 W/ HCPCS: Performed by: STUDENT IN AN ORGANIZED HEALTH CARE EDUCATION/TRAINING PROGRAM

## 2018-10-29 PROCEDURE — 83735 ASSAY OF MAGNESIUM: CPT

## 2018-10-29 PROCEDURE — 25000003 PHARM REV CODE 250: Performed by: STUDENT IN AN ORGANIZED HEALTH CARE EDUCATION/TRAINING PROGRAM

## 2018-10-29 PROCEDURE — 25000003 PHARM REV CODE 250: Performed by: INTERNAL MEDICINE

## 2018-10-29 PROCEDURE — 63600175 PHARM REV CODE 636 W HCPCS: Mod: JG | Performed by: STUDENT IN AN ORGANIZED HEALTH CARE EDUCATION/TRAINING PROGRAM

## 2018-10-29 PROCEDURE — 84100 ASSAY OF PHOSPHORUS: CPT

## 2018-10-29 PROCEDURE — 63600175 PHARM REV CODE 636 W HCPCS: Performed by: STUDENT IN AN ORGANIZED HEALTH CARE EDUCATION/TRAINING PROGRAM

## 2018-10-29 PROCEDURE — 85730 THROMBOPLASTIN TIME PARTIAL: CPT

## 2018-10-29 PROCEDURE — 63600175 PHARM REV CODE 636 W HCPCS: Performed by: HOSPITALIST

## 2018-10-29 PROCEDURE — 94761 N-INVAS EAR/PLS OXIMETRY MLT: CPT

## 2018-10-29 PROCEDURE — 97535 SELF CARE MNGMENT TRAINING: CPT

## 2018-10-29 PROCEDURE — 97116 GAIT TRAINING THERAPY: CPT

## 2018-10-29 RX ORDER — POTASSIUM CHLORIDE 20 MEQ/15ML
20 SOLUTION ORAL ONCE
Status: COMPLETED | OUTPATIENT
Start: 2018-10-29 | End: 2018-10-29

## 2018-10-29 RX ORDER — ACETAMINOPHEN 325 MG/1
650 TABLET ORAL EVERY 6 HOURS PRN
Status: DISCONTINUED | OUTPATIENT
Start: 2018-10-29 | End: 2018-10-29 | Stop reason: HOSPADM

## 2018-10-29 RX ORDER — CARVEDILOL 12.5 MG/1
12.5 TABLET ORAL 2 TIMES DAILY
Status: DISCONTINUED | OUTPATIENT
Start: 2018-10-29 | End: 2018-10-29 | Stop reason: HOSPADM

## 2018-10-29 RX ORDER — BUMETANIDE 2 MG/1
2 TABLET ORAL 2 TIMES DAILY
Qty: 60 TABLET | Refills: 6 | Status: SHIPPED | OUTPATIENT
Start: 2018-10-29

## 2018-10-29 RX ADMIN — Medication 10 ML: at 01:10

## 2018-10-29 RX ADMIN — IPRATROPIUM BROMIDE AND ALBUTEROL SULFATE 3 ML: .5; 3 SOLUTION RESPIRATORY (INHALATION) at 07:10

## 2018-10-29 RX ADMIN — IPRATROPIUM BROMIDE AND ALBUTEROL SULFATE 3 ML: .5; 3 SOLUTION RESPIRATORY (INHALATION) at 04:10

## 2018-10-29 RX ADMIN — INSULIN ASPART 4 UNITS: 100 INJECTION, SOLUTION INTRAVENOUS; SUBCUTANEOUS at 05:10

## 2018-10-29 RX ADMIN — INSULIN ASPART 4 UNITS: 100 INJECTION, SOLUTION INTRAVENOUS; SUBCUTANEOUS at 06:10

## 2018-10-29 RX ADMIN — Medication 10 ML: at 06:10

## 2018-10-29 RX ADMIN — FUROSEMIDE 80 MG: 10 INJECTION, SOLUTION INTRAMUSCULAR; INTRAVENOUS at 09:10

## 2018-10-29 RX ADMIN — HEPARIN SODIUM 5000 UNITS: 5000 INJECTION, SOLUTION INTRAVENOUS; SUBCUTANEOUS at 05:10

## 2018-10-29 RX ADMIN — IPRATROPIUM BROMIDE AND ALBUTEROL SULFATE 3 ML: .5; 3 SOLUTION RESPIRATORY (INHALATION) at 11:10

## 2018-10-29 RX ADMIN — POTASSIUM CHLORIDE 20 MEQ: 20 SOLUTION ORAL at 11:10

## 2018-10-29 RX ADMIN — ALTEPLASE 2 MG: 2.2 INJECTION, POWDER, LYOPHILIZED, FOR SOLUTION INTRAVENOUS at 05:10

## 2018-10-29 RX ADMIN — POLYETHYLENE GLYCOL 3350 17 G: 17 POWDER, FOR SOLUTION ORAL at 09:10

## 2018-10-29 RX ADMIN — HEPARIN SODIUM 5000 UNITS: 5000 INJECTION, SOLUTION INTRAVENOUS; SUBCUTANEOUS at 02:10

## 2018-10-29 RX ADMIN — PREDNISONE 60 MG: 20 TABLET ORAL at 09:10

## 2018-10-29 RX ADMIN — ACETAMINOPHEN 650 MG: 325 TABLET ORAL at 10:10

## 2018-10-29 RX ADMIN — CARVEDILOL 12.5 MG: 12.5 TABLET, FILM COATED ORAL at 11:10

## 2018-10-29 RX ADMIN — FUROSEMIDE 80 MG: 10 INJECTION, SOLUTION INTRAMUSCULAR; INTRAVENOUS at 02:10

## 2018-10-29 RX ADMIN — IPRATROPIUM BROMIDE AND ALBUTEROL SULFATE 3 ML: .5; 3 SOLUTION RESPIRATORY (INHALATION) at 03:10

## 2018-10-29 RX ADMIN — Medication 10 ML: at 03:10

## 2018-10-29 RX ADMIN — INSULIN DETEMIR 20 UNITS: 100 INJECTION, SOLUTION SUBCUTANEOUS at 09:10

## 2018-10-29 NOTE — PROGRESS NOTES
.Pharmacy New Medication Education    Patient accepted medication education.    Pharmacy educated patient on name and purpose of medications and possible side effects, using the teach-back method.     D/C albuterol-ipratropium 2.5 mg-0.5 mg/3 mL nebulizer solution 3 mL   D/C dextrose 50% injection 12.5 g   D/C dextrose 50% injection 25 g   D/C furosemide injection 80 mg   D/C glucagon (human recombinant) injection 1 mg   D/C glucose chewable tablet 16 g   D/C glucose chewable tablet 24 g   D/C heparin (porcine) injection 5,000 Units   D/C influenza (FLUZONE HIGH-DOSE) vaccine 0.5 mL   D/C insulin aspart U-100 pen 1-10 Units   D/C insulin detemir U-100 pen 20 Units   D/C labetalol injection 10 mg   D/C polyethylene glycol packet 17 g   D/C predniSONE tablet 60 mg   D/C ramelteon tablet 8 mg   D/C senna tablet 8.6 mg   D/C simethicone chewable tablet 125 mg   D/C sodium chloride 0.9% flush 10 mL       Learners of pharmacy medication education included:  Patient    Patient +/- learner response:  Verbalized Understanding, Teachback

## 2018-10-29 NOTE — PT/OT/SLP PROGRESS
Occupational Therapy   Treatment    Name: Tammie Saunders  MRN: [id number]  Admitting Diagnosis:  <principal problem not specified>       Recommendations:     Discharge Recommendations: home health PT, home health OT  Discharge Equipment Recommendations:  none  Barriers to discharge:  None    Subjective     Communicated with: RN prior to session.  Pain/Comfort:  · Pain Rating 1: 9/10  · Location - Side 1: Left  · Location - Orientation 1: generalized  · Location 1: leg  · Pain Addressed 1: Nurse notified, Cessation of Activity  · Pain Rating Post-Intervention 1: 9/10    Patients cultural, spiritual, Jew conflicts given the current situation:      Objective:     Patient found with: oxygen, telemetry, peripheral IV, tuttle catheter    General Precautions: Standard, fall, respiratory   Orthopedic Precautions:N/A   Braces: N/A     Occupational Performance:    Bed Mobility:    · Pt found sitting EOB    Functional Mobility/Transfers:  · Patient completed Sit <> Stand Transfer with stand by assistance  with  rolling walker   · Patient completed Bed <> Chair Transfer using Stand Pivot technique with stand by assistance and contact guard assistance with rolling walker  · Functional Mobility: Pt ambulated in room ~40 feet with CGA-SBA using RW.     Activities of Daily Living:  · Grooming: stand by assistance standing at sink, propped on forearms at times.  · Lower Body Dressing: maximal assistance lj socks.  Pt reports she props feet on stool or gets help from family at home to lj socks.      Patient left up in chair with all lines intact, call button in reach, RN notified and dtr present    [facility] 6 Click:  [facility] Total Score: 18    Treatment & Education:  Pt required seated rest break after gait before being able to perform G/H standing at sink secondary to LLE pain and SOB.  Pt instructed on PLB and Energy conservation techniques and provided with handout.     Education:    Assessment:     Tammie Saunders is a 67 y.o. female  with a medical diagnosis of <principal problem not specified>.  She presents with decreased overall strength, endurance, balance and [place] and safety with ADL's and fx mobility. Performance deficits affecting function are weakness, impaired endurance, impaired self care skills, impaired functional mobilty, gait instability, impaired balance, pain, decreased safety awareness, decreased lower extremity function, impaired cardiopulmonary response to activity.  Pt tolerated tx well with rest breaks secondary to SOB and LLE pain. Pt is progressing towards goals. Continue OT services to address functional goals, progressing as able.      Rehab Prognosis:  good; patient would benefit from acute skilled OT services to address these deficits and reach maximum level of function.       Plan:     Patient to be seen 5 x/week to address the above listed problems via self-care/home management, therapeutic activities, therapeutic exercises  · Plan of Care Expires: 11/27/18  · Plan of Care Reviewed with: patient    This Plan of care has been discussed with the patient who was involved in its development and understands and is in agreement with the identified goals and treatment plan    GOALS:   Multidisciplinary Problems     Occupational Therapy Goals        Problem: Occupational Therapy Goal    Goal Priority Disciplines Outcome Interventions   Occupational Therapy Goal     OT, PT/OT Ongoing (interventions implemented as appropriate)    Description:  Goals to be met by: 11/27     Patient will increase functional independence with ADLs by performing:    UE Dressing with Modified [place].  LE Dressing with Set-up Assistance.  Grooming while standing with Modified [place].  Toileting from toilet with Modified [place] for hygiene and clothing management.   Toilet transfer to toilet with Modified [place].  Upper extremity exercise program x10 reps per handout, with independence.                      Time  Tracking:     OT Date of Treatment: 10/29/18  OT Start Time: 1010  OT Stop Time: 1035  OT Total Time (min): 25 min    Billable Minutes:Self Care/Home Management 15  Therapeutic Activity 10    SKYE Hatfield  10/29/2018

## 2018-10-29 NOTE — PLAN OF CARE
10/29/18 1743   Final Note   Assessment Type Final Discharge Note   Anticipated Discharge Disposition Home-Health   What phone number can be called within the next 1-3 days to see how you are doing after discharge? [phone]   Hospital Follow Up  Appt(s) scheduled? Yes   Discharge plans and expectations educations in teach back method with documentation complete? Yes   Right Care Referral Info   Post Acute Recommendation Home-care   Referral Type (Home Health)   Facility Name ([facility])   City, State ([place], [place])

## 2018-10-29 NOTE — PLAN OF CARE
Problem: Occupational Therapy Goal  Goal: Occupational Therapy Goal  Goals to be met by: 11/27     Patient will increase functional independence with ADLs by performing:    UE Dressing with Modified [place].  LE Dressing with Set-up Assistance.  Grooming while standing with Modified [place].  Toileting from toilet with Modified [place] for hygiene and clothing management.   Toilet transfer to toilet with Modified [place].  Upper extremity exercise program x10 reps per handout, with independence.     Outcome: Ongoing (interventions implemented as appropriate)  Tammie Saunders is a 67 y.o. female with a medical diagnosis of <principal problem not specified>.  She presents with decreased overall strength, endurance, balance and [place] and safety with ADL's and fx mobility. Performance deficits affecting function are weakness, impaired endurance, impaired self care skills, impaired functional mobilty, gait instability, impaired balance, pain, decreased safety awareness, decreased lower extremity function, impaired cardiopulmonary response to activity.  Pt tolerated tx well with rest breaks secondary to SOB and LLE pain. Pt is progressing towards goals. Continue OT services to address functional goals, progressing as able.    YANIRA Hatfield/L

## 2018-10-29 NOTE — PLAN OF CARE
10/29/18 1744   Post-Acute Status   Post-Acute Authorization Home Health/Hospice   Home Health/Hospice Status Referrals Sent      consulted to arrange home health.  met with patient and daughter, Irene Yao at bedside regarding need. They were informed due to patient having [facility] referral would have to be sent to  [facility] preferred provider.  They voiced understandinig. Referral sent to [facility] via [facility].   will follow up in the am to see which agency can accept patient.

## 2018-10-29 NOTE — NURSING
Pt to be discharged home. Has Medications at home, no RX needed. Home health to be set up during business hours in AM. Requests to go home with PICC line intact as she receives her chemo through it. Both ports flush easily. Henley removed as ordered.

## 2018-10-29 NOTE — PLAN OF CARE
Problem: Nutrition, Imbalanced: Inadequate Oral Intake (Adult)  Intervention: Promote/Optimize Nutrition  Recommendation/Intervention:   1. Continue with 2000 calorie diet but adjust fluid restriciton to 1.5 L a day   2. Patient may benefit from out patient consult with Certified Diabetic Educator. AIC noted

## 2018-10-29 NOTE — PROGRESS NOTES
"[facility] Pulmonary/Critical Care Fellow Progress Note    Subjective:      Doing well today.  Back on home O2.  No chest pain, SOB, fevers, chills.     Objective:   24-hour Vitals:  Temp:  [96.1 °F (35.6 °C)-99.1 °F (37.3 °C)] 96.1 °F (35.6 °C)  Pulse:  [74-98] 74  Resp:  [18-84] 18  SpO2:  [89 %-98 %] 98 %  BP: (145-186)/(65-81) 148/65    Physical Examination:  Vitals: BP (!) 148/65   Pulse 74   Temp 96.1 °F (35.6 °C)   Resp 18   Ht 5' 6" (1.676 m)   Wt 119.3 kg (263 lb 0.1 oz)   SpO2 98%   Breastfeeding? No   BMI 42.45 kg/m²     General: Awake, alert, NAD, conversant without increased WOB  HEENT: MMM, EOMI, on NC  CV: RRR, normal S1/S2, no MRG  Chest: Bilateral wheeze persists  Abdomen: Soft, NT, obese  Extremities: 1+ pitting edema to the knees bilaterally  Neuro: AOx3, moves all extremities    Laboratory:  Trended Lab Data:  Recent Labs   Lab 10/27/18  0537 10/28/18  0551 10/29/18  0530   WBC 11.79 12.14 10.71   HGB 7.6* 7.4* 7.8*   HCT 23.9* 24.3* 25.7*    209 274       Recent Labs   Lab 10/27/18  0537 10/28/18  0551 10/29/18  0530    138 141   K 3.8 3.9 3.6    97 97   CO2 31* 31* 33*   BUN 33* 38* 35*   CREATININE 1.8* 1.7* 1.5*   * 185* 158*   CALCIUM 9.0 9.0 9.2   MG 2.1 2.1 2.0   PHOS 3.3 4.3 4.2       Recent Labs   Lab 10/27/18  0537 10/28/18  0551 10/29/18  0530   PROT 6.0 6.4 6.6   ALBUMIN 2.4* 2.4* 2.5*   BILITOT 0.5 0.4 0.4   AST 18 15 13   ALT 25 22 20   ALKPHOS 80 81 78       Recent Labs   Lab 10/25/18  2309   INR 1.1       Cardiac:   Recent Labs   Lab 10/25/18  2309  10/26/18  0537 10/26/18  1121 10/26/18  1650   TROPONINI 0.053*   < > 0.589* 0.558* 0.514*   *  --   --   --   --     < > = values in this interval not displayed.       FLP:   Lab Results   Component Value Date    CHOL 138 10/26/2018    HDL 34 (L) 10/26/2018    LDLCALC 93.0 10/26/2018    TRIG 55 10/26/2018    CHOLHDL 24.6 10/26/2018     DM:   Lab Results   Component Value Date    HGBA1C 10.2 (H) " 10/25/2018    LDLCALC 93.0 10/26/2018    CREATININE 1.5 (H) 10/29/2018     Thyroid:   Lab Results   Component Value Date    TSH 5.806 (H) 10/25/2018    FREET4 0.87 10/25/2018     Anemia:   Lab Results   Component Value Date    IRON 66 10/25/2018    TIBC 258 10/25/2018    FERRITIN 327 (H) 10/25/2018    TJHPAULQ34 1997 (H) 10/25/2018    FOLATE 10.5 10/25/2018     Urinalysis:   Lab Results   Component Value Date    COLORU Yellow 10/26/2018    SPECGRAV >=1.030 (A) 10/26/2018    NITRITE Negative 10/26/2018    KETONESU Negative 10/26/2018    UROBILINOGEN Negative 10/26/2018       Current Medications:     Infusions:       Scheduled:   albuterol-ipratropium  3 mL Nebulization Q4H    carvedilol  12.5 mg Oral BID    furosemide  80 mg Intravenous TID    heparin (porcine)  5,000 Units Subcutaneous Q8H    insulin detemir U-100  20 Units Subcutaneous BID    polyethylene glycol  17 g Oral Daily    predniSONE  60 mg Per NG tube Daily    sodium chloride 0.9%  10 mL Intravenous Q6H        PRN:  acetaminophen, dextrose 50%, dextrose 50%, glucagon (human recombinant), glucose, glucose, influenza, insulin aspart U-100, labetalol, ramelteon, senna, simethicone, Flushing PICC Protocol **AND** sodium chloride 0.9% **AND** sodium chloride 0.9%, sodium chloride 0.9%     Assessment:     Tammie Saunders is a 67 y.o.female with  Patient Active Problem List    Diagnosis Date Noted    Cardiac arrest 10/26/2018    COPD (chronic obstructive pulmonary disease) 10/26/2018    Acute hypercapnic respiratory failure 10/26/2018    Malignant neoplasm of overlapping sites of right lung 10/26/2018    Other heart failure 10/26/2018    Essential hypertension 10/26/2018    Type 2 diabetes mellitus, with long-term current use of insulin 10/26/2018    PEA (Pulseless electrical activity) 10/26/2018        Plan:     67yoF with hx of HTN, DM2, ?COPD, lung ca, presenting after PEA arrest w/ acute SOB.  Suspect it was likely secondary to some measure of  pulmonary edema but airway hyperreactivity could be playing some role.  Extubated on 10/27.  Now on home 2LNC.  Doing well.    - Exact etiology of her acute decompensation is unclear - whether this was all volume overload or does she also have a viral bronchiolitis? Regardless she has improved tremendously with diuretics and treatment for asthma/COPD  - Needs to follow up with her Pulmonologist  - May need repeat PFTs as an outpatient  - Continue diuretic on discharge  - Has new systolic dysfunction - likely needs eventual ischemic work up  - Complete course of PO steroids for bronchospasm  - Make sure vaccinations UTD    We will sign off.  Please call with questions.    Bart Reddy  [facility] Pulmonary/Critical Care Fellow    Pt seen and examined with Pulmonary/Critical Care team and this note reviewed and validated with the following additional comments:    Better but still wheezing.  She needs to F/U with her pulmonologist to figure out what her lung disease is.  OK to send home on O2 now.    Oscar Rocha MD  Phone [phone]

## 2018-10-29 NOTE — PT/OT/SLP PROGRESS
Physical Therapy Treatment    Patient Name:  Tammie Saunders   MRN:  [id number]    Recommendations:     Discharge Recommendations:  home with home health, other (see comments)(out patient pulmonary rehab)   Discharge Equipment Recommendations: none   Barriers to discharge: None    Assessment:     Tammie Saunders is a 67 y.o. female admitted with a medical diagnosis of <principal problem not specified>.  She presents with the following impairments/functional limitations:  weakness, gait instability, impaired cardiopulmonary response to activity, impaired endurance, impaired balance, impaired functional mobilty, edema, decreased lower extremity function . Patient able to ambulate using RW and O2 3 L ~ 60 ft . Tolerated wth drop to 89% O2 sat and return to 95% after gait with purse lip breathing..    Rehab Prognosis:  good; patient would benefit from acute skilled PT services to address these deficits and reach maximum level of function.      Recent Surgery: * No surgery found *      Plan:     During this hospitalization, patient to be seen 6 x/week to address the above listed problems via gait training, therapeutic activities, therapeutic exercises  · Plan of Care Expires:  11/27/18   Plan of Care Reviewed with: patient    Subjective     Communicated with primary nurse prior to session.  Patient found up in chair at bedside upon PT entry to room, agreeable to treatment.      Chief Complaint: SOB with exertion  Patient comments/goals: go home  Pain/Comfort:  · Pain Rating 1: other (see comments)(did not rate on scale )  · Location - Side 1: Left  · Location - Orientation 1: generalized  · Location 1: leg  · Pain Addressed 1: Reposition, Cessation of Activity, Distraction    Patients cultural, spiritual, Buddhism conflicts given the current situation:      Objective:     Patient found with: telemetry, oxygen, tuttle catheter     General Precautions: Standard, fall, respiratory   Orthopedic Precautions:N/A   Braces: N/A      Functional Mobility:  · Transfers:     · Sit to Stand:  stand by assistance with rolling walker  · Gait: ~ 60 ft with RW and O2  · Balance: Fair + with RW      AM-PAC 6 CLICK MOBILITY          Therapeutic Activities and Exercises:   reviewed ankle pumps, LAQ and marching while seated    Patient left up in chair with all lines intact, call button in reach and friends present..    GOALS:   Multidisciplinary Problems     Physical Therapy Goals        Problem: Physical Therapy Goal    Goal Priority Disciplines Outcome Goal Variances Interventions   Physical Therapy Goal     PT, PT/OT Ongoing (interventions implemented as appropriate)     Description:  Goals to be met by: 2018     Patient will increase functional independence with mobility by performin. Supine to sit with [place]  2. Sit to stand transfer with [place]  3. Bed to chair transfer with Modified [place] using Rolling Walker  4. Gait  x 150 feet with Modified [place] using Rolling Walker.                       Time Tracking:     PT Received On: 10/29/18  PT Start Time: 1335     PT Stop Time: 1355  PT Total Time (min): 20 min     Billable Minutes: Gait Training 20    Treatment Type: Treatment  PT/PTA: PT           Amauri Spicer, PT  10/29/2018

## 2018-10-29 NOTE — PROGRESS NOTES
[facility] IM Resident JOEL Progress Note    Subjective:      Pt asleep and resting comfortably when seen at bed side this morning. Reports that she feels well and her breathing has continued to improve. Diuresed 4.5 L over last 24 hours and 5.3L total since admission. Pt aware of plan to continue diuresis. States she take home bumex. Informed the patient she will be sent out on higher dose at discharge. Clinically improving.      Objective:   Last 24 Hour Vital Signs:  BP  Min: 136/65  Max: 190/81  Temp  Av.1 °F (36.7 °C)  Min: 97.6 °F (36.4 °C)  Max: 99.1 °F (37.3 °C)  Pulse  Av.4  Min: 79  Max: 98  Resp  Av.9  Min: 14  Max: 25  SpO2  Av.5 %  Min: 89 %  Max: 98 %  Weight  Av.3 kg (263 lb 0.1 oz)  Min: 119.3 kg (263 lb 0.1 oz)  Max: 119.3 kg (263 lb 0.1 oz)  I/O last 3 completed shifts:  In: 1320 [P.O.:1320]  Out: 4590 [Urine:4590]    Physical Examination:  Gen: Obese. On 2L NC, No apparent distress. Speaking in full sentences smiling   HEENT: NC/AT. Conjuctiva clear, sclera anicteric  Pulm: Wheezes improved from previous exam. No cracklles, rhonchi, or rubs appreciated.   CV: RRR, no S3/S4, M/R/G  Abd: Soft, non-distended, non-tender Unable to appreciate bowel sounds but significant white noise in room.   MSK: No gross deformity. + Mild edema in lower extremiities  Skin: No rashes or lesions.   Neuro: Following commands, alert and oriented. Conversing with family    Laboratory:  Laboratory Data Reviewed:   Pertinent Findings:  Recent Labs     10/27/18  0537 10/28/18  0551   WBC 11.79 12.14   HGB 7.6* 7.4*   HCT 23.9* 24.3*    209   MCV 88 91   RDW 15.8* 15.9*   GRAN 89.9*  10.6* 86.1*  10.4*   LYMPH 4.8*  0.6* 4.9*  0.6*   MONO 5.3  0.6 8.7  1.1*   EOS 0.0 0.0   BASO 0.00 0.01   EOSINOPHIL 0.0 0.0   BASOPHIL 0.0 0.1       Recent Labs     10/27/18  0537 10/28/18  0551    138   K 3.8 3.9    97   CO2 31* 31*   BUN 33* 38*   CREATININE 1.8* 1.7*   * 185*     Recent  Labs     10/28/18  0735 10/28/18  1217 10/28/18  1712 10/28/18  1926 10/29/18  0356   POCTGLUCOSE 153* 153* 402* 373* 223*     Recent Labs     10/27/18  0537 10/28/18  0551   PROT 6.0 6.4   ALBUMIN 2.4* 2.4*   BILITOT 0.5 0.4   AST 18 15   ALT 25 22   ALKPHOS 80 81         Microbiology Data Reviewed:   Pertinent Findings:      Other Results:      Radiology Data Reviewed:   Pertinent Findings:  Imaging Results          US Lower Extremity Veins Bilateral (Final result)  Result time 10/26/18 02:08:45    Final result by Florentin Beyer MD (10/26/18 02:08:45)                 Impression:      No evidence of deep venous thrombosis in either lower extremity.      Electronically signed by: Florentin Beyer MD  Date:    10/26/2018  Time:    02:08             Narrative:    EXAMINATION:  US LOWER EXTREMITY VEINS BILATERAL    CLINICAL HISTORY:  concern for DVT; Acute respiratory failure with hypoxia    TECHNIQUE:  Duplex and color flow Doppler and dynamic compression was performed of the bilateral lower extremity veins was performed.    COMPARISON:  None    FINDINGS:  Right thigh veins: The common femoral, femoral, popliteal, upper greater saphenous, and deep femoral veins are patent and free of thrombus. The veins are normally compressible and have normal phasic flow and augmentation response.    Right calf veins: The visualized calf veins are patent.    Left thigh veins: The common femoral, femoral, popliteal, upper greater saphenous, and deep femoral veins are patent and free of thrombus. The veins are normally compressible and have normal phasic flow and augmentation response.    Left calf veins: The visualized calf veins are patent.    Miscellaneous: None                               CT Head Without Contrast (Final result)  Result time 10/26/18 00:37:22    Final result by Sarah Hawley MD (10/26/18 00:37:22)                 Impression:      No acute intracranial abnormalities identified.      Electronically signed  by: Sarah Hawley MD  Date:    10/26/2018  Time:    00:37             Narrative:    EXAMINATION:  CT HEAD WITHOUT CONTRAST    CLINICAL HISTORY:  AMS;    TECHNIQUE:  Low dose axial images were obtained through the head.  Coronal and sagittal reformations were also performed. Contrast was not administered.    COMPARISON:  None.    FINDINGS:  There is mild chronic microvascular ischemic change.  No evidence of acute/recent major vascular distribution cerebral infarction, intraparenchymal hemorrhage, or intra-axial space occupying lesion. The ventricular system is normal in size and configuration with no evidence of hydrocephalus. No effacement of the skull-base cisterns. No abnormal extra-axial fluid collections or blood products. The visualized paranasal sinuses and mastoid air cells are clear. The calvarium shows no significant abnormality.  Bilateral exophthalmos is noted.                               CT Chest Without Contrast (Final result)  Result time 10/26/18 00:47:24   Procedure changed from CTA Chest Non-Coronary (PE Study)     Final result by Sarah Hawley MD (10/26/18 00:47:24)                 Impression:      1. Confluent bilateral perihilar consolidative changes with air bronchograms with additional more patchy consolidation seen within the left lower lobe.  Findings may reflect multifocal pneumonia.  Future follow-up is recommended as potential underlying neoplastic process unable to be excluded.  2. Interlobular septal thickening with additional patchy opacities and scattered ground-glass attenuation seen within the lungs suggestive for superimposed pulmonary edema.  3. Small bilateral pleural effusions with resultant compressive atelectasis.      Electronically signed by: Sarah Hawley MD  Date:    10/26/2018  Time:    00:47             Narrative:    EXAMINATION:  CT CHEST WITHOUT CONTRAST    CLINICAL HISTORY:  hypoxia;    TECHNIQUE:  Low dose axial images, sagittal and coronal reformations were  obtained from the thoracic inlet to the lung bases. Contrast was not administered.    COMPARISON:  None.    FINDINGS:  Structures at the base of the neck are unremarkable.  Left-sided PICC line is visualized with distal tip in the SVC.  Aorta is non-aneurysmal.  The heart is normal in size without pericardial effusion.  Aortic and coronary artery atherosclerosis is seen.  Several prominent mediastinal lymph nodes are seen.  Esophagus is unremarkable along its course.    The trachea and bronchi are patent.  Endotracheal tube is visualized within the distal trachea extending just above the liu projecting toward the right mainstem bronchus.  Prominent confluent consolidative changes are seen involving the bilateral perihilar regions with air bronchograms.  Additional more patchy consolidative changes are seen within the left lower lobe.  Small bilateral pleural effusions with additional compressive atelectatic changes are seen within the lower lobes.  There is interlobular septal thickening with scattered ground-glass attenuation and patchy opacities seen throughout the lungs which may reflect pulmonary edema.  Some of these opacities demonstrates somewhat nodular configuration.    The visualized abdominal structures are unremarkable.  Gallbladder has been removed.  Osseous structures demonstrate mild age-appropriate degenerative change.  Extrathoracic soft tissues are unremarkable.                               X-Ray Chest AP Portable (Final result)  Result time 10/25/18 23:09:40    Final result by Sarah Hawley MD (10/25/18 23:09:40)                 Impression:      See above.      Electronically signed by: Sarah Hawley MD  Date:    10/25/2018  Time:    23:09             Narrative:    EXAMINATION:  XR CHEST AP PORTABLE    CLINICAL HISTORY:  Failed or difficult intubation, initial encounter    TECHNIQUE:  Single frontal view of the chest was performed.    COMPARISON:  None    FINDINGS:  Evaluation limited by  large body habitus and relative underpenetrated portable technique.    Left-sided PICC line is visualized with distal tip over the SVC projecting horizontally toward the right.  Endotracheal tube is visualized with distal tip approximately 1.5 cm above the liu.  Support device overlies the left chest wall.    Cardiac silhouette is upper limits of normal in size.  There is prominence of central pulmonary vasculature with bilateral perihilar opacity and increased interstitial attenuation suggestive for pulmonary edema.  Consolidative changes are visualized in the right midlung zone perihilar region.  This could reflect pneumonia or asymmetric edema, with potential perihilar lesion not excluded.  No prior studies are available for comparison.  No evidence of pneumothorax or significant effusion.                                  Current Medications:     Infusions:       Scheduled:   albuterol-ipratropium  3 mL Nebulization Q4H    furosemide  80 mg Intravenous TID    heparin (porcine)  5,000 Units Subcutaneous Q8H    insulin detemir U-100  20 Units Subcutaneous BID    polyethylene glycol  17 g Oral Daily    predniSONE  60 mg Per NG tube Daily    sodium chloride 0.9%  10 mL Intravenous Q6H        PRN:  dextrose 50%, dextrose 50%, glucagon (human recombinant), glucose, glucose, influenza, insulin aspart U-100, labetalol, ramelteon, senna, simethicone, Flushing PICC Protocol **AND** sodium chloride 0.9% **AND** sodium chloride 0.9%, sodium chloride 0.9%    Antibiotics and Day Number of Therapy:  PO Vanc, IV flagyl    Lines and Day Number of Therapy:      Assessment:     Tammie Saunders is a 67 y.o.female with  Patient Active Problem List    Diagnosis Date Noted    Cardiac arrest 10/26/2018    COPD (chronic obstructive pulmonary disease) 10/26/2018    Acute hypercapnic respiratory failure 10/26/2018    Malignant neoplasm of overlapping sites of right lung 10/26/2018    Other heart failure 10/26/2018    Essential  hypertension 10/26/2018    Type 2 diabetes mellitus, with long-term current use of insulin 10/26/2018    PEA (Pulseless electrical activity) 10/26/2018        Plan:     # PEA cardiac arrest  - Received 2 rounds of chest compressions, 1 mg epinephrine before achieving ROSC  - Following commands after ROSC  - Likely due to hypoxemia. Had SPO2 in the 70's in the field prior to the arrest  - Start heparin ggt now.  - TTE, trend trops/EKG, duplex LE US. Holding off getting CTA chest for PE as patient had marked renal insufficiency on presentation (Cr 1.8, GFR 32). Can't get V/Q scan as patient is on vent. No evidence of PE on CTA. Heparin drip discontinued   - BNP elevated at 161. Patient is morbidly obese, so mildly elevated BNP is of little utility.    - Strict I/Os. Now net 5.3L negative. Plan to continue but decrease diuresis today pending labs. Likely dc today vs tomorrow after assessing on team rounds. Will discharge on new home Lasix/Bumex regimen.     # Acute combined respiratory failure   - EMS reports SPO2 in the 70s in the field  - AB.05 pH, 94 PCO2, 220 O2, BE -4 (post arrest)  -  PE study negative  -Now extubated. On 3L NC  - intrinsic pulmonary disease (COPD and cancer)  - Conitnue diuresis today. Wean O2 as appropriate.     # Lung cancer  - Family reports patient currently receiving chemotherapy at [facility] for lung CA. Has had chemoradiation and immunotherapy in the past.    # DM2, insulin dependent   - On Tresiba 80 U nightly and Novolog 20 U with lunch and sliding scale at home  - A1c 10.2  - Continue levemir 20 U and SSI now. Glucose checks q4h.    # COPD  - On 2 L home oxygen  - Duonebs at home. Continue.    # HTN   - Home meds: Carvedilol 25 mg twice daily. Hold.     Disposition: Pending clinical improvement. Discharge today vs tomorrow.     Code Status:   FULL Code    Neeraj Pyle  hospitals Internal Medicine HO-I  U IM Service Team B    hospitals Medicine Hospitalist Pager numbers:   hospitals  Hospitalist Medicine Team A (Kirt/Betty): [phone]  [facility] Hospitalist Medicine Team B (Wali/Twila):  [phone]

## 2018-10-29 NOTE — PROGRESS NOTES
Introduced as VN for night shift that will be working with floor nurse and nursing assistant.  Family member at bedside.  Educated on safety and fall precautions, VTE exercises and heparin, TCDB, turning frequently in bed, medications for constipation, and fluid restriction.  Questions answered.  Instructed to call for assistance.  Will cont to monitor.

## 2018-10-29 NOTE — DISCHARGE SUMMARY
[facility] Hospital Medicine Discharge Summary    Primary Team: [facility] Hospitalist Team B  Attending Physician: Fabricio Keyes MD  Resident: Edie Feliciano  Intern: Kenji Pyle    Date of Admit: 10/25/2018  Date of Discharge: 10/29/2018    Discharge to: Home with home health  Condition: Stable    Discharge Diagnoses     Patient Active Problem List   Diagnosis    COPD (chronic obstructive pulmonary disease)    Malignant neoplasm of overlapping sites of right lung    Other heart failure    Essential hypertension    Type 2 diabetes mellitus, with long-term current use of insulin       Consultants and Procedures     Consultants:  Cards, Heme/onc, Pulm    Procedures:   None    Imaging:  CXRx3, KUB, CTA    Brief History of Present Illness      Ms. Saunders is a 68 y/o female with PMH significant for DM2, HTN, COPD, lung cancer (currently on chemo) who presented to [facility] in PEA arrest. Per report from ED, pt was feeling SOB at home. EMS was called to the house and found patient satting in the 70's. She was placed on CPAP and transported to the hospital. On arrival, the patient was found to be in PEA arrest. 2 rounds of chest compressions and 1 mg of epi were given before ROSC was achieved. Bedside echo was unremarkable (per ED physician) and EKG showed RBBB. I spoke with one of the patient's daughters, Nicole ([phone]), who was present when the patient developed symptoms. She states that the patient acutely developed SOB at home. There were no other antecedent symptoms such as headache, chest pain, back pain, nausea, vomiting, SOB.      Per the patient's family, Ms. Saunders has a history of lung cancer and is currently on chemotherapy. She is followed by oncology at [facility]. She has been treated with chemoradiation and immunotherapy in the past. The patient is also on home 2 L home oxygen.     Pt came in with PEA arrest. Had ROSC in the ED. Likely 2/2 respiratory failure. Remained intubated and in ICU for a day.  Moved to the floor and aggressively diuresed getting over 5L of fluid off of her. Will discharge on more frequent Bumex. Stable at dc. Will follow up with cards, pulm, onc, PCP.     For the full HPI please refer to the History & Physical from this admission.    Hospital Course By Problem with Pertinent Findings        # PEA cardiac arrest  - Received 2 rounds of chest compressions, 1 mg epinephrine before achieving ROSC  - Following commands after ROSC  - Likely due to hypoxemia. Had SPO2 in the 70's in the field prior to the arrest  - Start heparin ggt now.  - TTE, trend trops/EKG, duplex LE US. Holding off getting CTA chest for PE as patient had marked renal insufficiency on presentation (Cr 1.8, GFR 32). Can't get V/Q scan as patient is on vent. No evidence of PE on CTA. Heparin drip discontinued   - BNP elevated at 161. Patient is morbidly obese, so mildly elevated BNP is of little utility.    - Strict I/Os. Now net 5.3L negative. Plan to continue but decrease diuresis today pending labs. Likely dc today vs tomorrow after assessing on team rounds. Will discharge on new home Lasix/Bumex regimen.   - Discharge on Bumex 2 mg BID.      # Acute combined respiratory failure   - EMS reports SPO2 in the 70s in the field  - AB.05 pH, 94 PCO2, 220 O2, BE -4 (post arrest)  -  PE study negative  -Now extubated. On 3L NC  - intrinsic pulmonary disease (COPD and cancer)  - Conitnue diuresis today. Wean O2 as appropriate.      # Lung cancer  - Family reports patient currently receiving chemotherapy at [facility] for lung CA. Has had chemoradiation and immunotherapy in the past.     # DM2, insulin dependent   - On Tresiba 80 U nightly and Novolog 20 U with lunch and sliding scale at home  - A1c 10.2  - Continue levemir 20 U and SSI now. Glucose checks q4h.     # COPD  - On 2 L home oxygen  - Duonebs at home. Continue.     # HTN   - Home meds: Carvedilol 25 mg twice daily. Hold.            Discharge Medications       Tammie Saunders   Home Medication Instructions ROSA:[id number]    Printed on:10/29/18 7735   Medication Information                      albuterol (PROVENTIL) 2.5 mg /3 mL (0.083 %) nebulizer solution  Take 2.5 mg by nebulization every 6 (six) hours as needed for Wheezing. Rescue             albuterol (VENTOLIN HFA) 90 mcg/actuation inhaler  Inhale 2 puffs into the lungs every 4 (four) hours as needed for Wheezing. Rescue             ALPRAZolam (XANAX) 1 MG tablet  Take 1 mg by mouth every 4 (four) hours as needed for Anxiety.             amoxicillin-clavulanate 875-125mg (AUGMENTIN) 875-125 mg per tablet  Take 1 tablet by mouth 2 (two) times daily.             aspirin (ECOTRIN) 81 MG EC tablet  Take 81 mg by mouth once daily.             atorvastatin (LIPITOR) 80 MG tablet  Take 80 mg by mouth once daily.             azithromycin (Z-KARYN) 250 MG tablet  Take 250 mg by mouth once daily.             bumetanide (BUMEX) 2 MG tablet  Take 1 tablet (2 mg total) by mouth 2 (two) times daily.             carvedilol (COREG) 25 MG tablet  Take 25 mg by mouth 2 (two) times daily.             codeine-guaifenesin 7.5-225 mg/5 mL Liqd  Take 5 mLs by mouth every 6 (six) hours as needed for Cough.             fluconazole (DIFLUCAN) 100 MG tablet  Take 100 mg by mouth once daily.             gabapentin (NEURONTIN) 100 MG capsule  Take 100 mg by mouth 2 (two) times daily.             HYDROcodone-acetaminophen (NORCO) 5-325 mg per tablet  Take 1 tablet by mouth every 8 (eight) hours as needed for Pain.             HYDROmorphone (DILAUDID) 4 MG tablet  Take 4 mg by mouth every 8 (eight) hours as needed for Pain.             insulin aspart U-100 (NOVOLOG) 100 unit/mL injection  Inject 32 Units into the skin with lunch.             insulin degludec (TRESIBA FLEXTOUCH U-100) 100 unit/mL (3 mL) InPn  Inject 80 Units into the skin.             ipratropium (ATROVENT) 0.02 % nebulizer solution  Take 500 mcg by nebulization every 6 (six) hours as  needed for Wheezing. Rescue             levoFLOXacin (LEVAQUIN) 750 MG tablet  Take 750 mg by mouth once daily.             naproxen (NAPROSYN) 500 MG tablet  Take 500 mg by mouth 2 (two) times daily as needed.             ondansetron (ZOFRAN) 4 MG tablet  Take 4 mg by mouth every 8 (eight) hours as needed for Nausea.             predniSONE (DELTASONE) 10 MG tablet  Take 10 mg by mouth 2 (two) times daily.             promethazine-codeine 6.25-10 mg/5 ml (PHENERGAN WITH CODEINE) 6.25-10 mg/5 mL syrup  Take 5 mLs by mouth every 6 (six) hours as needed for Cough.             ranitidine (ZANTAC) 300 MG capsule  Take 300 mg by mouth nightly.                 Discharge Information:   Diet:  Cardiac Diabetic Diet    Physical Activity:  As tolerated    Instructions:  1. Take all medications as prescribed  2. Keep all follow-up appointments  3. Return to the hospital or call your primary care physicians if any worsening symptoms such as fever, chest pain, shortness of breath, return of symptoms, or any other concerns.    Follow-Up Appointments:  Onc, Cards, Pulm, PCP    Neeraj Pyle MD  [facility] Internal Medicine, HO-1

## 2018-10-29 NOTE — PLAN OF CARE
Problem: Patient Care Overview  Goal: Plan of Care Review  Outcome: Ongoing (interventions implemented as appropriate)  Pt is aaox4. Pt is on telemetry 8591 and running normal sinus rhythm. Pt complained of wheezing and respiratory treatment given. Pt is on 3L of 02. Pt requested stool softener and senakot.  given . ACHS 373 and insulin given. No complaints of pain or nausea. Ambulates with 1 person assist. Pt is on a 2000 caloric diabetic diet with a 1000 ml fluid restriction. Safety precautions maintained and tolerated meds well

## 2018-10-29 NOTE — PLAN OF CARE
Problem: Physical Therapy Goal  Goal: Physical Therapy Goal  Goals to be met by: 2018     Patient will increase functional independence with mobility by performin. Supine to sit with [place]  2. Sit to stand transfer with [place]  3. Bed to chair transfer with Modified [place] using Rolling Walker  4. Gait  x 150 feet with Modified [place] using Rolling Walker.      Outcome: Ongoing (interventions implemented as appropriate)  Recommend Home with HH vs outpatient cardiopulmonary rehab

## 2018-10-29 NOTE — PROGRESS NOTES
Spoke with Dr. Morales about patient still feeling bloated after having small bowel movement today.  Receiving mirilax daily; new order received.  Also spoke to him about removing tuttle today; will keep tuttle in tonight and revisit option in morning.

## 2018-10-29 NOTE — NURSING
Pt complaining of leg bone pain which she explains as having experienced before secondary to her chemotherapy. Dr Keyes team notified. Awaiting order for pain medication.

## 2018-10-29 NOTE — PROGRESS NOTES
" [facility]  Adult Nutrition  Progress Note    SUMMARY       Recommendations    Recommendation/Intervention:   1. Continue with 2000 calorie diet but adjust fluid restriciton to 1.5 L a day   2. Patient may benefit from out patient consult with Certified Diabetic Educator. AIC noted.     Goals: TF will be started within 24-48 hours  Nutrition Goal Status: goal met  Communication of RD Recs: reviewed with RN     Nutrition Discharge Planning: Home on 2000 calorie, 1 L fluid restriction      Reason for Assessment    Reason for Assessment: RD follow-up  Diagnosis: (cardiac arrest)    Relevant Medical History: DM, HTN, COPD, lung CA  Past Medical History:   Diagnosis Date    Cancer     lung cancer    COPD (chronic obstructive pulmonary disease)     Diabetes mellitus     Hypertension      Past Surgical History:   Procedure Laterality Date    CHOLECYSTECTOMY      VASCULAR SURGERY      L LE bypass       General Information Comments: NFPE not warranted. Pt well nourished. Denies weight loss. Pt out of bed to chair. Completing breathing treatment. Pt reports past diet edu and declines further. Pt reports eating fair due to dislike of food served.    Nutrition Risk Screen    Nutrition Risk Screen: no indicators present    Nutrition/Diet History    Patient Reported Diet/Restrictions/Preferences: diabetic diet, low salt  Typical Food/Fluid Intake: Pt denies dec in po intake prior to admit  Food Preferences: none  Do you have any cultural, spiritual, Mu-ism conflicts, given your current situation?: no  Factors Affecting Nutritional Intake: None identified at this time    Anthropometrics    Temp: 96.1 °F (35.6 °C)  Height Method: Estimated  Height: 5' 6" (167.6 cm)  Height (inches): 66 in  Weight Method: Bed Scale  Weight: 119.3 kg (263 lb 0.1 oz)  Weight (lb): 263.01 lb  Ideal Body Weight (IBW), Female: 130 lb  % Ideal Body Weight, Female (lb): 202.32 lb  BMI (Calculated): 42.5  BMI Grade: greater than " 40 - morbid obesity  Usual Body Weight (UBW), k kg  Weight Change Amount: (none)  % Usual Body Weight: 100.46  % Weight Change From Usual Weight: 0.25 %       Lab/Procedures/Meds    Pertinent Labs Reviewed: reviewed  Recent Labs   Lab 10/29/18  0530   CALCIUM 9.2   PROT 6.6      K 3.6   CO2 33*   CL 97   BUN 35*   CREATININE 1.5*   ALKPHOS 78   ALT 20   AST 13   BILITOT 0.4       Hemoglobin A1C   Date Value Ref Range Status   10/25/2018 10.2 (H) 4.0 - 5.6 % Final     Comment:     ADA Screening Guidelines:  5.7-6.4%  Consistent with prediabetes  >or=6.5%  Consistent with diabetes  High levels of fetal hemoglobin interfere with the HbA1C  assay. Heterozygous hemoglobin variants (HbS, HgC, etc)do  not significantly interfere with this assay.   However, presence of multiple variants may affect accuracy.         Pertinent Medications Reviewed: reviewed  Scheduled Meds:   albuterol-ipratropium  3 mL Nebulization Q4H    carvedilol  12.5 mg Oral BID    furosemide  80 mg Intravenous TID    heparin (porcine)  5,000 Units Subcutaneous Q8H    insulin detemir U-100  20 Units Subcutaneous BID    polyethylene glycol  17 g Oral Daily    predniSONE  60 mg Per NG tube Daily    sodium chloride 0.9%  10 mL Intravenous Q6H     Continuous Infusions:  PRN Meds:.acetaminophen, dextrose 50%, dextrose 50%, glucagon (human recombinant), glucose, glucose, influenza, insulin aspart U-100, labetalol, ramelteon, senna, simethicone, Flushing PICC Protocol **AND** sodium chloride 0.9% **AND** sodium chloride 0.9%, sodium chloride 0.9%        Physical Findings/Assessment    Overall Physical Appearance: obese  Tubes: orogastric tube  Oral/Mouth Cavity: WDL  Skin: intact    Estimated/Assessed Needs    Weight Used For Calorie Calculations: 119.3 kg (263 lb 0.1 oz)  Energy Calorie Requirements (kcal): 1730  Energy Need Method: Nely Parisi  Protein Requirements: 83g (1.4g/kg)  Weight Used For Protein Calculations: 59 kg (130 lb  1.1 oz)(IBW)     Fluid Need Method: RDA Method  RDA Method (mL): 1730  CHO Requirement: 45%      Nutrition Prescription Ordered    Current Diet Order: 2000 calorie, 1 L Fr    Evaluation of Received Nutrient/Fluid Intake    I/O: reviewed  Energy Calories Required: not meeting needs  Protein Required: not meeting needs  Fluid Required: not meeting needs  Comments: LBM: 10/28 +bloating  Tolerance: tolerating     % Intake of Estimated Energy Needs: 25 - 50 %  % Meal Intake: 25 - 50 %    Nutrition Risk    Level of Risk/Frequency of Follow-up: (1 x week)     Assessment and Plan    Cardiac arrest    Contributing Nutrition Diagnosis  Inadequate energy intake    Related to (etiology):   intubation    Signs and Symptoms (as evidenced by):   NPO    INutrition Diagnosis Status:   resolved            Monitor and Evaluation    Food and Nutrient Intake: energy intake  Food and Nutrient Adminstration: diet order  Physical Activity and Function: nutrition-related ADLs and IADLs  Anthropometric Measurements: weight  Biochemical Data, Medical Tests and Procedures: electrolyte and renal panel  Nutrition-Focused Physical Findings: overall appearance     Nutrition Follow-Up    RD Follow-up?: Yes

## 2018-10-29 NOTE — NURSING
Morning round completed.  Patient denies any questions, concerns or needs at this time.  Will continue to monitor.

## 2018-10-30 ENCOUNTER — PATIENT OUTREACH (OUTPATIENT)
Dept: ADMINISTRATIVE | Facility: CLINIC | Age: 68
End: 2018-10-30

## 2018-10-30 NOTE — NURSING
Discharge instructions reviewed with patient and daughter.  Time allowed for questions and all questions answered.  Will report to the floor nurse.

## 2018-10-30 NOTE — PT/OT/SLP DISCHARGE
Physical Therapy Discharge Summary    Name: Tammie Saunders  MRN: [id number]   Principal Problem: Cardiac arrest     Patient Discharged from acute Physical Therapy on 10/29/2018.  Please refer to prior PT noted date on 10/29/2018 for functional status.     Assessment:     Patient appropriate for care in another setting.    Objective:     GOALS:   Multidisciplinary Problems     Physical Therapy Goals        Problem: Physical Therapy Goal    Goal Priority Disciplines Outcome Goal Variances Interventions   Physical Therapy Goal     PT, PT/OT Ongoing (interventions implemented as appropriate)     Description:  Goals to be met by: 2018     Patient will increase functional independence with mobility by performin. Supine to sit with [place]  2. Sit to stand transfer with [place]  3. Bed to chair transfer with Modified [place] using Rolling Walker  4. Gait  x 150 feet with Modified [place] using Rolling Walker.                       Reasons for Discontinuation of Therapy Services  Transfer to alternate level of care.      Plan:     Patient Discharged to: Home with Home Health Service.    Amauri Spicer, PT  10/30/2018

## 2018-10-30 NOTE — PATIENT INSTRUCTIONS
Discharge Instructions for Heart Attack  You have had a heart attack (acute myocardial infarction). A heart attack occurs when a vessel that sends blood to your heart suddenly becomes blocked. This causes your heart not to work as well as it should. Follow these guidelines for home care and lifestyle changes.  Home care  · Take your medicines exactly as directed. Dont skip doses. Talk with your healthcare provider if your medicines aren't working for you. Together you can come up with another treatment plan.  · Remember that recovery after a heart attack takes time. Plan to rest for at least 4 to 8 weeks while you recover. Then return to normal activity when your doctor says its OK.  · Ask your doctor about joining a heart rehabilitation program. This can help strengthen your heart and lungs and give you more energy and confidence.  · Tell your doctor if you are feeling depressed. Feelings of sadness are common after a heart attack. But it is important to speak to someone or seek counseling if you are feeling overwhelmed by these feelings.  · Call 911 right away if you have chest pain or pain that goes to your shoulder, neck, or back. Don't drive yourself to the hospital.  · Ask your family members to learn CPR. This is an important skill that can save lives when it's needed.  · Learn to take your own blood pressure and pulse. Keep a record of your results. Ask your doctor when you should seek emergency medical attention. He or she will tell you which blood pressure reading is dangerous.  Lifestyle changes  Your heart attack might have been caused by cardiovascular disease. Your healthcare provider will work with you to make changes to your lifestyle. This will help the heart disease from getting worse. These changes will most likely be a combination of diet and exercise.  Diet  Your healthcare provider will tell you what changes you need to make to your diet. You may need to see a registered dietitian for help  with these diet changes. These changes may include:  · Cutting back on how much fat and cholesterol you eat  · Cutting back on how much salt (sodium) you eat, especially if you have high blood pressure  · Eating more fresh vegetables and fruits  · Eating lean proteins such as fish, poultry, beans, and peas, and eating less red meat and processed meats  · Using low-fat dairy products  · Using vegetable and nut oils in limited amounts  · Limiting how many sweets and processed foods such as chips, cookies, and baked goods you eat  · Limiting how often you eat out. And when you do eat out, making better food choices.  · Not eating fried or greasy foods, or foods high in saturated fat  Exercise  Your healthcare provider may tell you to get more exercise if you haven't been physically active. Depending on your case, your provider may recommend that you get moderate to vigorous physical activity for at least 40 minutes each day, and for at least 3 to 4 days each week. A few examples of moderate to vigorous activity include:  · Walking at a brisk pace, about 3 to 4 miles per hour  · Jogging or running  · Swimming or water aerobics  · Hiking  · Dancing  · Martial arts  · Tennis  · Riding a bicycle or stationary bike  Other changes  Your healthcare provider may also recommend that you:  · Lose weight. If you are overweight or obese, your provider will work with you to lose extra pounds. Making diet changes and getting more exercise can help. A good goal is to lose your 10% of your body weight in one year.  · Stop smoking. Sign up for a stop-smoking program to make it more likely for you to quit for good. You can join a stop-smoking support group. Or ask your doctor about nicotine replacement products.  · Learn to manage stress. Stress management techniques to help you deal with stress in your home and work life. This will help you feel better emotionally and ease the strain on your heart.  Follow-up  Make a follow-up  appointment as directed by our staff.     When to seek medical advice  Call 911 right away if you have:  · Chest pain that goes to your neck, jaw, back, or shoulder  · Shortness of breath  Otherwise, call your doctor immediately if you have:  · Lightheadedness, dizziness, or fainting  · Feeling of irregular heartbeat or fast pulse.   Date Last Reviewed: 10/1/2016  © 6298-2160 [employer]. [address] [place] [address], [place] [address]. All rights reserved. This information is not intended as a substitute for professional medical care. Always follow your healthcare professional's instructions.

## 2018-10-31 LAB
BACTERIA BLD CULT: NORMAL
BACTERIA BLD CULT: NORMAL